# Patient Record
Sex: MALE | Race: WHITE | NOT HISPANIC OR LATINO | Employment: UNEMPLOYED | URBAN - METROPOLITAN AREA
[De-identification: names, ages, dates, MRNs, and addresses within clinical notes are randomized per-mention and may not be internally consistent; named-entity substitution may affect disease eponyms.]

---

## 2017-03-02 ENCOUNTER — OFFICE VISIT (OUTPATIENT)
Dept: NUTRITION | Facility: CLINIC | Age: 32
End: 2017-03-02
Payer: MEDICARE

## 2017-03-02 DIAGNOSIS — E78.2 MIXED HYPERLIPIDEMIA: Primary | ICD-10-CM

## 2017-03-02 PROCEDURE — 97802 MEDICAL NUTRITION INDIV IN: CPT | Mod: GY | Performed by: DIETITIAN, REGISTERED

## 2017-03-06 VITALS — HEIGHT: 73 IN | BODY MASS INDEX: 31.23 KG/M2 | WEIGHT: 235.6 LBS

## 2017-03-06 NOTE — PROGRESS NOTES
"REPORT OF MEDICAL NUTRITION THERAPY    Patient Name: Sid Zarco  MRN: [id number],  Age: 31 year old,  Gender: male    Encounter Date: 3/2/2017,  Encounter Time:30 minutes Initial      Provider: Nisa Magana, RICHA, LD, CDE    Referral received to provide Medical Nutrition Therapy for patient for treatment of hyperlipidemia.    NUTRITION HISTORY:    Sid states he had been referred to nutrition several times in the past due to his elevated lipids.  He reports that he has a strong family history of this, and is wondering how much influence his lifestyle changes will have on reducing risk.    He is a student now at [employer] and is very serious about his studies because he wants to move to a 4 year degree, and post graduate degree in the future.  He spends many hours studying, and has class on Monday and Wednesday.  On school days he does not eat until after school.  Overall, his intake pattern is somewhat irregular, and includes many meals in restaurants or prepared outside.    He takes 4-5 walks per week.  1 soda per month.  He  Has been improving his beverages.  Drinks water.  Low onion and low green pepper.  He has recently gained 10 pounds.    Usual intake consists of:  Breakfast:Skips often, coffee with 2% milk.  Will switch to 1%.  He does not like cereal, if he does have breakfast it may be a frozen convenience meal or a sandwich.  Snack:  Lunch:Eats late, often has fast food or restaurant food  Snack:  Dinner: Processed foods or fast foods often  Snack:        DATA:    Height:  6' .5\"  Weight: 235 lbs 9.6 oz  Body Mass Index: Body mass index is 31.51 kg/(m^2).     Wt Readings from Last 5 Encounters:   03/02/17 106.9 kg (235 lb 9.6 oz)   11/22/16 103.5 kg (228 lb 3.2 oz)   03/22/16 103.2 kg (227 lb 8 oz)   09/16/15 105.3 kg (232 lb 3.2 oz)   03/12/15 102 kg (224 lb 14.4 oz)     BP Readings from Last 5 Encounters:   11/22/16 126/78   03/22/16 133/79   09/16/15 130/84   03/12/15 114/76   11/12/14 137/78 "     LABS:  Last Basic Metabolic Panel:  Lab Results   Component Value Date     11/16/2014      Lab Results   Component Value Date    POTASSIUM 5.1 01/13/2015     Lab Results   Component Value Date    CHLORIDE 103 11/16/2014     Lab Results   Component Value Date    JACLYN 9.1 11/16/2014     Lab Results   Component Value Date    CO2 28 11/16/2014     Lab Results   Component Value Date    BUN 14 11/16/2014     Lab Results   Component Value Date    CR 0.97 01/13/2015     Lab Results   Component Value Date    GLC 90 03/22/2016     Recent Labs   Lab Test  11/22/16   1050  03/22/16   1126   CHOL  269*  237*   HDL  51  42   LDL  179*  155*   TRIG  197*  198*       MEDICATIONS:    Current Outpatient Prescriptions   Medication     montelukast (SINGULAIR) 10 MG tablet     albuterol (PROAIR HFA, PROVENTIL HFA, VENTOLIN HFA) 108 (90 BASE) MCG/ACT inhaler     zolpidem (AMBIEN) 10 MG tablet     ALPRAZolam (XANAX) 1 MG tablet     VITAMIN D, CHOLECALCIFEROL, PO     DoxePIN (SINEQUAN) 10 MG capsule     divalproex (DEPAKOTE ER) 500 MG 24 hr tablet     No current facility-administered medications for this visit.      ASSESSMENT:    Sid has a strong family history of dyslipidemia and his levels are high related to both genetics and a poor diet that has been high in fat and processed foods, and excessive in caloric density.    He is highly motivated to make changes, but recognizes his present role as a student has contributed to his poor eating style.    INTERVENTION:    Role of nutrition in lipid control was discussed.  Importance of reducing saturated fat by using low fat meat and dairy was discussed.  Importance of some home food preparation discussed.  Role of nutrition in weight control was discussed.  Strategy for success was discussed including importance of increased movement throughout the day, consistent restricted carb intake with choices coming from whole foods, and the importance of adequate intake of health promoting  food.  Importance of adequate but not excessive intake in a balanced and consistent pattern was emphasized along with the very important role of movement throughout the day in fighting insulin resistance.    In addition to calories in food, importance of balance and consistency of meals with adequate intake of health promoting foods was discussed.  Consistent carbohydrate meal planning was reviewed.  Carb choices are recommended to be from whole grains and vegetables, whole fruit and dairy.   Importance of reducing saturated fat by using low fat meat and dairy was discussed.  Mediterranean style food pattern was discussed as a model for food inclusion.  Recipes and menus were provided and discussed.  Mediterranean pattern was discussed as a method to reduce cardiovascular risk.    Energy balance was discussed, with focus on balance of foods consumed, amount of food eaten, timing of meals and activity level.  Nutrition to support activity was reviewed.  Importance of increasing activity throughout the day was again emphasized.  Written materials were provided and questions were answered    Energy balance was discussed, with focus on balance of foods consumed, amount of food eaten, timing of meals and activity level.    Written materials were provided and questions were answered.    PLAN:    Sid will avoid fast food.  He will plan in advance for meals in restaurants.  Options of healthy choices were discussed.  He will increase vegetable intake.  He will increase movement and activity in his day.  He will try some new recipes.    FOLLOW-UP  Patient will return as his schedule allows for follow-up as needed.      [address] [id number]  [phone]  [phone]    Date: 3/6/2017  Time: 2:06 PM

## 2017-10-25 ENCOUNTER — TRANSFERRED RECORDS (OUTPATIENT)
Dept: HEALTH INFORMATION MANAGEMENT | Facility: CLINIC | Age: 32
End: 2017-10-25

## 2017-11-06 DIAGNOSIS — F41.1 GENERALIZED ANXIETY DISORDER: Primary | ICD-10-CM

## 2017-11-06 DIAGNOSIS — E78.00 ELEVATED CHOLESTEROL: ICD-10-CM

## 2017-11-06 DIAGNOSIS — J45.909 ASTHMA: ICD-10-CM

## 2017-11-06 DIAGNOSIS — F41.0 PANIC DISORDER WITHOUT AGORAPHOBIA WITH PANIC ATTACKS IN FULL REMISSION: ICD-10-CM

## 2017-11-06 DIAGNOSIS — Z86.59 HISTORY OF SCHIZOAFFECTIVE DISORDER: ICD-10-CM

## 2017-11-22 ENCOUNTER — OFFICE VISIT (OUTPATIENT)
Dept: SLEEP MEDICINE | Facility: CLINIC | Age: 32
End: 2017-11-22
Attending: PSYCHIATRY & NEUROLOGY
Payer: MEDICARE

## 2017-11-22 VITALS
HEART RATE: 73 BPM | BODY MASS INDEX: 31.83 KG/M2 | WEIGHT: 235 LBS | RESPIRATION RATE: 16 BRPM | SYSTOLIC BLOOD PRESSURE: 138 MMHG | OXYGEN SATURATION: 96 % | DIASTOLIC BLOOD PRESSURE: 78 MMHG | HEIGHT: 72 IN

## 2017-11-22 DIAGNOSIS — G47.21 DELAYED SLEEP PHASE SYNDROME: ICD-10-CM

## 2017-11-22 DIAGNOSIS — Z82.0 FAMILY HISTORY OF SLEEP APNEA: ICD-10-CM

## 2017-11-22 DIAGNOSIS — G47.00 INSOMNIA, UNSPECIFIED TYPE: Primary | ICD-10-CM

## 2017-11-22 DIAGNOSIS — F41.1 GENERALIZED ANXIETY DISORDER: ICD-10-CM

## 2017-11-22 DIAGNOSIS — R06.83 SNORING: ICD-10-CM

## 2017-11-22 PROCEDURE — 99204 OFFICE O/P NEW MOD 45 MIN: CPT | Performed by: PHYSICIAN ASSISTANT

## 2017-11-22 NOTE — PROGRESS NOTES
Sleep Consultation:    Date on this visit: 11/22/2017    Sid Zarco is sent by Nathan Pham for a sleep consultation regarding insomnia.    Primary Physician: Erik Newberyr     Sid Zarco reports waking after 3-6 hours of sleep for about the last 3 months. His medical history is significant for JODI/panic disorder, bipolar disorder, asthma, epilepsy (diagnosed in youth, not confirmed on subsequent EEGs), obesity. His chart shows a history of schizoaffective disorder, unclear if that is a correct diagnosis.    He started college around the time his trouble sleeping started. He has classes only on Monday and Wednesday mornings. He has not had insomnia like this in the past. He has had difficulty falling asleep in the past but not staying asleep. He has been on zolpidem for about 1.5 years. He has been on doxepin for a couple of weeks. It does seem to help him get back to sleep. He is sometimes sleepy (difficulty getting out of bed) in the morning. He has slept through his alarm and woke around 8 AM. Once he gets up, his energy is fine. He takes the medications 30-40 minutes before bedtime. He denies having more panic attacks since starting school. He says it is possible his anxiety is a little higher at a baseline level.     Sid goes to sleep at 11:00 PM during the week. He wakes up at 7:00 AM with an alarm on Mondays and Wednesdays only. He has found it harder to get up to the alarm since being on doxepin. Prior to doxepin, he was awake before the alarm. He falls asleep in 40 minutes.  Sid has difficulty falling asleep on some nights. It can take a couple of hours. He has been taking 3 mg doxepin and 10 mg zolpidem nightly.  He wakes up 1 time a night for 120 minutes before falling back to sleep.  Sid wakes up to either anxiety or possible sleep apnea.  On weekends, Sid goes to sleep at 2:00 AM.  He wakes up at 10:00 AM without an alarm. He will still have middle of the night awakenings. He is less  sleepy when he can sleep in. He falls asleep more quickly when he goes to bed at 2 AM.  Patient gets an average of 3-5 hours of sleep per week night and 5-7 hours on weekends. He has been on quetiapine and hydroxyzine in the past.    Patient does use electronics in bed, watch TV in bed and worry in bed about school work or his day and does not read in bed. He was taking alprazolam about 4 times per week to help him sleep until he got doxepin. He has not used it much since then.    Sid is not currently working. He lives with his mother and brother.  His brother and father have KELLEY.    Sid does snore every night and snoring is loud. His snoring seems to be louder in the last few months. Patient does have a regular roommate (his brother). His brother has not left the room because of his snoring. There is no report of gasping, snorting or witnessed apneas.  Patient sleeps on his back, side and stomach. He has occasional morning dry mouth, denies snort arousals, morning headaches, morning confusion and restless legs. He has been getting [place] when he wakes occasionally. Sid has occasional bruxism and sleep talking and denies any sleep walking, dream enactment, sleep paralysis, cataplexy and hypnogogic/hypnopompic hallucinations.    Sid has heartburn, depression and anxiety, denies difficulty breathing through his nose, claustrophobia and reflux at night.      Sid has gained 5-10 pounds in 5 years.  Patient describes themself as a night person.  He would prefer to go to sleep at 2:00 AM and wake up at 10:00 AM.  Patient's [place] Sleepiness score 0/24 inconsistent with daytime sleepiness. He says he was really tired when he was only getting 3 hours of sleep. He denies that he was dozing, but said he would spill beverages because his coordination was off.      Sid naps 1-2 times per week for 40 minutes, feels refreshed after naps. He rests if he did not get much sleep the night before. He does not always  fall asleep. He takes no inadvertant naps.  He denies dozing while driving but does feel inattentive. Patient was counseled on the importance of driving while alert, to pull over if drowsy, or nap before getting into the vehicle if sleepy.  He uses 20-40 cups/day of coffee/tea. Last caffeine intake is usually 8-9 hours before bed.    Allergies:    Allergies   Allergen Reactions     Dust Mites      Mildew        Medications:    Current Outpatient Prescriptions   Medication Sig Dispense Refill     montelukast (SINGULAIR) 10 MG tablet Take 1 tablet (10 mg) by mouth At Bedtime 90 tablet 1     albuterol (PROAIR HFA, PROVENTIL HFA, VENTOLIN HFA) 108 (90 BASE) MCG/ACT inhaler Inhale 2 puffs into the lungs every 6 hours as needed for shortness of breath / dyspnea Okay to dispense any form of albuterol inhaler that is covered by insurance. 1 Inhaler 1     zolpidem (AMBIEN) 10 MG tablet Take 10 mg by mouth nightly as needed  3     ALPRAZolam (XANAX) 1 MG tablet as needed   3     VITAMIN D, CHOLECALCIFEROL, PO Take 5,000 Units by mouth daily       DoxePIN (SINEQUAN) 10 MG capsule Take 1 capsule (10 mg) by mouth At Bedtime       divalproex (DEPAKOTE ER) 500 MG 24 hr tablet Take 1 tablet (500 mg) by mouth 2 times daily 60 tablet 0       Problem List:  Patient Active Problem List    Diagnosis Date Noted     Mixed hyperlipidemia 03/30/2016     Priority: Medium     Schizoaffective disorder, unspecified 03/22/2016     Priority: Medium     Obesity (BMI 30-39.9) 09/16/2015     Priority: Medium     Schizo-affective psychosis (H) 11/08/2014     Priority: Medium     Schizophrenic episode, acute, chronic condition (H) 12/29/2013     Priority: Medium     Chronic mental illness 12/27/2012     Priority: Medium     Followed by psychiatry - Dr. Pete at [facility].       CARDIOVASCULAR SCREENING; LDL GOAL LESS THAN 160 01/12/2011     Priority: Medium     Panic disorder      Priority: Medium     Generalized anxiety disorder      Priority: Medium     " Dr. Gudino follows  He recommended xanax and mirtazipine            Past Medical/Surgical History:  Past Medical History:   Diagnosis Date     Epilepsy and recurrent seizures     \"in remission\", not on any meds     Generalized anxiety disorder      Mild persistent asthma 10/25/2010     Panic disorder      Schizoaffective disorder      Past Surgical History:   Procedure Laterality Date     none         Social History:  Social History     Social History     Marital status: Single     Spouse name: N/A     Number of children: 0     Years of education: N/A     Occupational History      Unemployed     Social History Main Topics     Smoking status: Former Smoker     Smokeless tobacco: Never Used     Alcohol use Yes      Comment: socially, on holidays     Drug use: No     Sexual activity: No     Other Topics Concern     Not on file     Social History Narrative       Family History:  Family History   Problem Relation Age of Onset     DIABETES Maternal Grandfather      DIABETES Paternal Grandmother      Schizophrenia Mother      DIABETES Mother      Bipolar Disorder Father      Anxiety Disorder Brother      Schizophrenia Brother      Depression Brother      Bipolar Disorder Brother      Schizophrenia Other        Review of Systems:  A complete review of systems reviewed by me is negative with the exeption of what has been mentioned in the history of present illness.  CONSTITUTIONAL: NEGATIVE for weight gain/loss, fever, chills, sweats or night sweats, drug allergies.  EYES: NEGATIVE for changes in vision, blind spots, double vision.  ENT: NEGATIVE for ear pain, sore throat, sinus pain, post-nasal drip, runny nose, bloody nose  CARDIAC: NEGATIVE for fast heartbeats or fluttering in chest, breathlessness when lying flat, swollen legs or swollen feet.  CARDIAC:  POSITIVE for  chest pain and chest pressure  NEUROLOGIC: NEGATIVE headaches, numbness in the arms or legs.  NEUROLOGIC:  POSITIVE for  weakness in the arms or " legs  DERMATOLOGIC: NEGATIVE for rashes, new moles or change in mole(s)  PULMONARY: NEGATIVE SOB at rest, SOB with activity, coughing up blood, wheezing or whistling when breathing.    PULMONARY:  POSITIVE for  dry cough and productive cough  GASTROINTESTINAL: NEGATIVE for nausea or vomitting, loose or watery stools, fat or grease in stools, constipation, abdominal pain.  GASTROINTESTINAL:  POSITIVE for  bowel movements black in color and blood in stool  GENITOURINARY: NEGATIVE for pain during urination, blood in urine, irregular menstrual periods.  GENITOURINARY:  POSITIVE for  urinating more frequently than usual  MUSCULOSKELETAL: NEGATIVE for bone or joint pain, swollen joints.  MUSCULOSKELETAL:  POSITIVE for  muscle pain  ENDOCRINE: NEGATIVE for increased thirst or urination, diabetes.  LYMPHATIC: NEGATIVE for swollen lymph nodes, lumps or bumps in the breasts or nipple discharge.  MENTAL HEALTH: POSITIVE for depression, anxiety    Physical Examination:  Vitals: /78  Pulse 73  Resp 16  Ht 1.829 m (6')  Wt 106.6 kg (235 lb)  SpO2 96%  BMI 31.87 kg/m2  Neck Circumference: 40 cm.     [place] Total Score 11/22/2017   Total score - [place] 0       GENERAL APPEARANCE: healthy, alert, no distress and cooperative  EYES: Eyes grossly normal to inspection, PERRL, conjunctivae and sclerae normal and lids and lashes normal  HENT: nose and mouth without ulcers or lesions, oral mucous membranes moist and oropharynx clear  NECK: no adenopathy, no asymmetry, masses, or scars, thyroid normal to palpation and trachea midline and normal to palpation  RESP: lungs clear to auscultation - no rales, rhonchi or wheezes  CV: regular rates and rhythm, normal S1 S2, no S3 or S4, no murmur, click or rub and no irregular beats  LYMPHATICS: normal ant/post cervical and supraclavicular nodes  MS: extremities normal- no gross deformities noted  NEURO: Normal strength and tone, mentation intact, speech normal and cranial nerves 2-12  intact  Mallampati Class: II.  Tonsillar Stage: 2  visible at pillars.    Impression/Plan:    (G47.00) Insomnia, unspecified type  (primary encounter diagnosis), (G47.21) Delayed sleep phase syndrome, (F41.1) Generalized anxiety disorder  Comment: Since the end of August, he has been waking after 3-6 hours of sleep and having difficulty getting back to sleep. This coincides with starting classes on Mondays and Wednesdays. His sleep schedule was about 2-10 AM, which he keeps on days he does not have classes. He tries to sleep 11 PM to 7 AM when he has classes.  His trouble sleeping can occur on any day of the week, not just the nights he has class. He has been on zolpidem 10 mg for at least 1.5 years. Doxepin 3 mg was added with this new insomnia and it has been fairly helpful at getting him back to sleep more quickly. He has felt a little more difficulty getting out of bed in the morning, although he says it is not necessarily because he is sleepy. He has a history of anxiety and says it is possible he has an increase in his baseline anxiety levels, but overall that did not seem to resonate with him.   Plan: We discussed getting 30 minutes of bright light exposure in the morning, either with the sun or a SAD Lamp of 10,000 lux intensity. Avoid bright light, including electronics, in the hour before bedtime. Take 0.5-1 mg melatonin 3-5 hours prior to natural sleep onset. Set the alarm gradually earlier by 5-10 minutes every other day. Move the bedtime in a corresponding fashion to maintain 8 hours in bed each night. See if you can sleep 1 AM to 9 AM nightly and 1-7 AM on days with class. You may try to nap for up to 2 hours on those days. Alternatively, if having success advancing his sleep schedule, he may continue to try to move it earlier and keep a fairly consistent schedule on all days.     (R06.83) Snoring, (Z82.0) Family history of sleep apnea  Comment: Sid has some concerns for KELLEY. Overall, his risk is  somewhat low, and I don't think that apnea would be contributing significantly to his current difficulty sleeping as there is no reason why apnea would have changed abruptly at the time of onset of the insomnia. His STOP BANG is 3 (snoring, neck 40 cm, male). Negative risk factors include; no sleepiness (ESS 0/24), no observed apnea, no HTN, BMI <35 (31), age <50 (32). He does however have a strong family history of KELLEY in his father and brother, so I am recommending a screening with oximetry.  Plan: Overnight oximetry study        I will call him with results of the oximetry. I also advised him to download the SnoreLab joya on his phone so that he can listen for snorts, gasps and pauses in his breathing during sleep. I will order a sleep study if the oximetry or SnoreLab observations are abnormal.      Literature provided regarding sleep apnea and insomnia.      He will follow up with me in approximately two months.       Polysomnography reviewed.  Obstructive sleep apnea reviewed.  Complications of untreated sleep apnea were reviewed.  45 minutes was spent during this visit, over 50% in counseling and coordination of care.   Bennett Goltz, PA-C    CC: Nathan Pham

## 2017-11-22 NOTE — PATIENT INSTRUCTIONS
Aim for 8 hours in bed nightly. Set your alarm in the morning every day. Set it 5-10 minutes earlier every other day. Try to move your sleep schedule to 1-9 AM. On school days, keep going to bed at 1 AM and get up at 7 AM. You may try to take a nap in the early afternoon, no more than 2 hours. If you can advance your schedule more than that, it will help to get you more sleep on a consistent basis at night. See how far you can move your sleep schedule. Stop if you get to 11 PM to 7 AM. You may find that it will advance to a point and then won't move earlier.     Instructions for treating Delayed Sleep Phase Syndrome:    Delayed Sleep Phase Syndrome (DSPS) means that your body's internal timing is set late compared to the 24 hour day. Therefore, it is often difficult to get up on time for work in the morning and sometimes difficult to fall asleep on time, in order to get enough sleep. People with DSPS often tend to like to stay up late on weekends and sleep in until between 10 AM and noon, sometimes even later.This is actually a bad habit that will perpetuate the problem. It reinforces your body's tendency to be on that later schedule.    You should go to bed when you are sleepy and ready to sleep. During this entire process, you should not engage in activities that may make it worse, such as watching TV in bed, leaving the TV on all night, drinking any caffeine 6 hours before bed or exercising 1-2 hours before bed.     Start taking Melatonin, 0.5-1 mg tablet 3-5 hours before the time that you fall asleep on average (not your desired bedtime or time that you get in bed, but the time you normally fall asleep on your own).     Upon awakening, get exposure to sun-light for about 30-45 minutes. You do not need to look at the sun, in fact, this is dangerous. Reading the paper with the sun shining on you is adequate.  Alternatively, you may use a Seasonal Affective Disorder Lamp (intensity 10,000 Lux) instead of the sun.  The lamp should be positioned 1-2 arms lengths away from you. They lamps are sold at Home [employer] Companies such as [employer], [employer] or [employer]. A prescription can be written to get insurance coverage in some cases. They are also sold on [employer].    Using the light and melatonin should help march your internal clock (known as your circadian rhythms) gradually earlier. As your bedtime advances, remember to take your melatonin earlier, keeping it 5 hours before your fall asleep time.    Avoid naps and sleeping in because sleeping during the day will delay your body's clock and you will have to start from scratch.     More information about light therapy:  If the cost of any light box is too much, you can also purchase a compact fluorescent all spectrum light bulb at a local hardware store for about $8.  The most commonly available bulb is 1400 lumen.  You would need two of these positioned within 1 meter of yourself to be equivalent to 2,500 lux.  The bulbs can be placed in a standard light fixture.  Additionally, they can be placed in a mountable fixture that is used in greenhouses.  Mountable fixtures are also available at hardware stores for about $9.  Do not look directly at the light.  If you have any concerns regarding the safety of bright light therapy for you, it is recommended that you consult an ophthalmologist before using a light box.  If you have a condition that makes your eyes very sensitive to light, macular degeneration, a family history of such problems, or diabetic changes to your eyes, consult an ophthalmologist before using a light box. If you have anxiety disorder and have an increase in anxiety discontinue use.         MY TREATMENT INFORMATION FOR SLEEP APNEA-  Sid Zarco    DOCTOR : Bennett Ezra Goltz, PA-C  SLEEP CENTER : [facility]     MY CONTACT NUMBER: [phone]    Am I having a sleep study at a sleep center?  Make sure you have an appointment for the study before you  "leave!    Am I having a home sleep study?  Watch this video:  https://www.[employer].com/watch?v=CteI_GhyP9g&list=PLC4F_nvCEvSxpvRkgPszaicmjcb2PMExm    Frequently asked questions:  1. What is Obstructive Sleep Apnea (KELLEY)? KELLEY is the most common type of sleep apnea. Apnea means, \"without breath.\"  Apnea is most often caused by narrowing or collapse of the upper airway as muscles relax during sleep.   Almost everyone has occasional apneas. Most people with sleep apnea have had brief interruptions at night frequently for many years.  The severity of sleep apnea is related to how frequent and severe the events are.   2. What are the consequences of KELLEY? Symptoms include: feeling sleepy during the day, snoring loudly, gasping or stopping of breathing, trouble sleeping, and occasionally morning headaches or heartburn at night.  Sleepiness can be serious and even increase the risk of falling asleep while driving. Other health consequences may include development of high blood pressure and other cardiovascular disease in persons who are susceptible. Untreated KELLEY  can contribute to heart disease, stroke and diabetes.   3. What are the treatment options? In most situations, sleep apnea is a lifelong disease that must be managed with daily therapy. Medications are not effective for sleep apnea and surgery is generally not considered until other therapies have been tried. Your treatment is your choice. Continuous Positive Airway (CPAP) works right away and is the therapy that is effective in nearly everyone. An oral device to hold your jaw forward is usually the next most reliable option. Other options include postioning devices (to keep you off your back), weight loss, and surgery including a tongue pacing device. There is more detail about some of these options below.    Important tips for using CPAP and similar devices   Know your equipment:  CPAP is continuous positive airway pressure that prevents obstructive sleep apnea by " keeping the throat from collapsing while you are sleeping. In most cases, the device is  smart  and can slowly self-adjusts if your throat collapses and keeps a record every day of how well you are treated-this information is available to you and your care team.  BPAP is bilevel positive airway pressure that keeps your throat open and also assists each breath with a pressure boost to maintain adequate breathing.  Special kinds of BPAP are used in patients who have inadequate breathing from lung or heart disease. In most cases, the device is  smart  and can slowly self-adjusts to assist breathing. Like CPAP, the device keeps a record of how well you are treated.  Your mask is your connection to the device. You get to choose what feels most comfortable and the staff will help to make sure if fits. Here: are some examples of the different masks that are available:       Key points to remember on your journey with sleep apnea:  1. Sleep study.  PAP devices often need to be adjusted during a sleep study to show that they are effective and adjusted right.  2. Good tips to remember: Try wearing just the mask during a quiet time during the day so your body adapts to wearing it. A humidifier is recommended for comfort in most cases to prevent drying of your nose and throat. Allergy medication from your provider may help you if you are having nasal congestion.  3. Getting settled-in. It takes more than one night for most of us to get used to wearing a mask. Try wearing just the mask during a quiet time during the day so your body adapts to wearing it. A humidifier is recommended for comfort in most cases. Our team will work with you carefully on the first day and will be in contact within 4 days and again at 2 and 4 weeks for advice and remote device adjustments. Your therapy is evaluated by the device each day.   4. Use it every night. The more you are able to sleep naturally for 7-8 hours, the more likely you will have good  sleep and to prevent health risks or symptoms from sleep apnea. Even if you use it 4 hours it helps. Occasionally all of us are unable to use a medical therapy, in sleep apnea, it is not dangerous to miss one night.   5. Communicate. Call our skilled team on the number provided on the first day if your visit for problems that make it difficult to wear the device. Over 2 out of 3 patients can learn to wear the device long-term with help from our team. Remember to call our team or your sleep providers if you are unable to wear the device as we may have other solutions for those who cannot adapt to mask CPAP therapy. It is recommended that you sleep your sleep provider within the first 3 months and yearly after that if you are not having problems.   Take care of your equipment. Make sure you clean your mask and tubing using directions every day and that your filter and mask are replaced as recommended or if they are not working.     BESIDES CPAP, WHAT OTHER THERAPIES ARE THERE?    Positioning Device  Positioning devices are generally used when sleep apnea is mild and only occurs on your back.This example shows a pillow that straps around the waist. It may be appropriate for those whose sleep study shows milder sleep apnea that occurs primarily when lying flat on one's back. Preliminary studies have shown benefit but effectiveness at home may need to be verified by a home sleep test. These devices are generally not covered by medical insurance.  Examples of devices that maintain sleeping on the back to prevent snoring and mild sleep apnea.    Belt type body positioner  Http://[employer]/    Electronic reminder  Http://nightshifttherapy.com/  Http://www.[employer]Box[employer].au/    Oral Appliance  What is oral appliance therapy?  An oral appliance device fits on your teeth at night like a retainer used after having braces. The device is made by a specialized dentist and requires several visits over 1-2 months before a  manufactured device is made to fit your teeth and is adjusted to prevent your sleep apnea. Once an oral device is working properly, snoring should be improved. A home sleep test may be recommended at that time if to determine whether the sleep apnea is adequately treated.       Some things to remember:  -Oral devices are often, but not always, covered by your medical insurance. Be sure to check with your insurance provider.   -If you are referred for oral therapy, you will be given a list of specialized dentists to consider or you may choose to visit the Web site of the American Academy of Dental Sleep Medicine  -Oral devices are less likely to work if you have severe sleep apnea or are extremely overweight.     More detailed information  An oral appliance is a small acrylic device that fits over the upper and lower teeth  (similar to a retainer or a mouth guard). This device slightly moves jaw forward, which moves the base of the tongue forward, opens the airway, improves breathing for effective treat snoring and obstructive sleep apnea in perhaps 7 out of 10 people .  The best working devices are custom-made by a dental device  after a mold is made of the teeth 1, 2, 3.  When is an oral appliance indicated?  Oral appliance therapy is recommended as a first-line treatment for patients with primary snoring, mild sleep apnea, and for patients with moderate sleep apnea who prefer appliance therapy to use of CPAP4, 5. Severity of sleep apnea is determined by sleep testing and is based on the number of respiratory events per hour of sleep.   How successful is oral appliance therapy?  The success rate of oral appliance therapy in patients with mild sleep apnea is 75-80% while in patients with moderate sleep apnea it is 50-70%. The chance of success in patients with severe sleep apnea is 40-50%. The research also shows that oral appliances have a beneficial effect on the cardiovascular health of KELLEY patients  at the same magnitude as CPAP therapy7.  Oral appliances should be a second-line treatment in cases of severe sleep apnea, but if not completely successful then a combination therapy utilizing CPAP plus oral appliance therapy may be effective. Oral appliances tend to be effective in a broad range of patients although studies show that the patients who have the highest success are females, younger patients, those with milder disease, and less severe obesity. 3, 6.   Finding a dentist that practices dental sleep medicine  Specific training is available through the American Academy of Dental Sleep Medicine for dentists interested in working in the field of sleep. To find a dentist who is educated in the field of sleep and the use of oral appliances, near you, visit the Web site of the American Academy of Dental Sleep Medicine.    References  1. Maylin et al. Objectively measured vs self-reported compliance during oral appliance therapy for sleep-disordered breathing. Chest 2013; 144(5): 9139-5978.  2. Merly et al. Objective measurement of compliance during oral appliance therapy for sleep-disordered breathing. Thorax 2013; 68(1): 91-96.  3. Melissa, et al. Mandibular advancement devices in 620 men and women with KELLEY and snoring: tolerability and predictors of treatment success. Chest 2004; 125: 2929-7252.  4. Palm, et al. Oral appliances for snoring and KELLEY: a review. Sleep 2006; 29: 244-262.  5. Josafat et al. Oral appliance treatment for KELLEY: an update. J Clin Sleep Med 2014; 10(2): 215-227.  6. Lakeisha et al. Predictors of OSAH treatment outcome. J Dent Res 2007; 86: 1953-6552.    Weight Loss:    Weight loss is a long-term strategy that may improve sleep apnea in some patients.    Weight management is a personal decision.  If you are interested in exploring weight loss strategies, the following discussion covers the impact on weight loss on sleep apnea and the approaches that may be  successful.    Weight loss decreases severity of sleep apnea in most people with obesity. For those with mild obesity who have developed snoring with weight gain, even 15-30 pound weight loss can improve and occasionally eliminate sleep apnea.  Structured and life-long dietary and health habits are necessary to lose weight and keep healthier weight levels.     Though there may be significant health benefits from weight loss, long-term weight loss is very difficult to achieve- studies show success with dietary management in less than 10% of people. In addition, substantial weight loss may require years of dietary control and may be difficult if patients have severe obesity. In these cases, surgical management may be considered.  Finally, older individuals who have tolerated obesity without health complications may be less likely to benefit from weight loss strategies.        Your BMI is Body mass index is 31.87 kg/(m^2).  Weight management is a personal decision.  If you are interested in exploring weight loss strategies, the following discussion covers the approaches that may be successful. Body mass index (BMI) is one way to tell whether you are at a healthy weight, overweight, or obese. It measures your weight in relation to your height.  A BMI of 18.5 to 24.9 is in the healthy range. A person with a BMI of 25 to 29.9 is considered overweight, and someone with a BMI of 30 or greater is considered obese. More than two-thirds of American adults are considered overweight or obese.  Being overweight or obese increases the risk for further weight gain. Excess weight may lead to heart disease and diabetes.  Creating and following plans for healthy eating and physical activity may help you improve your health.  Weight control is part of healthy lifestyle and includes exercise, emotional health, and healthy eating habits. Careful eating habits lifelong are the mainstay of weight control. Though there are significant  health benefits from weight loss, long-term weight loss with diet alone may be very difficult to achieve- studies show long-term success with dietary management in less than 10% of people. Attaining a healthy weight may be especially difficult to achieve in those with severe obesity. In some cases, medications, devices and surgical management might be considered.  What can you do?  If you are overweight or obese and are interested in methods for weight loss, you should discuss this with your provider.     Consider reducing daily calorie intake by 500 calories.     Keep a food journal.     Avoiding skipping meals, consider cutting portions instead.    Diet combined with exercise helps maintain muscle while optimizing fat loss. Strength training is particularly important for building and maintaining muscle mass. Exercise helps reduce stress, increase energy, and improves fitness. Increasing exercise without diet control, however, may not burn enough calories to loose weight.       Start walking three days a week 10-20 minutes at a time    Work towards walking thirty minutes five days a week     Eventually, increase the speed of your walking for 1-2 minutes at time    In addition, we recommend that you review healthy lifestyles and methods for weight loss available through the National Institutes of Health patient information sites:  http://win.niddk.nih.gov/publications/index.htm    And look into health and wellness programs that may be available through your health insurance provider, employer, local community center, or shane club.    Weight management plan: Patient was referred to their PCP to discuss a diet and exercise plan.    Surgery:    Surgery for obstructive sleep apnea is considered generally only when other therapies fail to work. Surgery may be discussed with you if you are having a difficult time tolerating CPAP and or when there is an abnormal structure that requires surgical correction.  Nose and  throat surgeries often enlarge the airway to prevent collapse.  Most of these surgeries create pain for 1-2 weeks and up to half of the most common surgeries are not effective throughout life.  You should carefully discuss the benefits and drawbacks to surgery with your sleep provider and surgeon to determine if it is the best solution for you.   More information  Surgery for KELLEY is directed at areas that are responsible for narrowing or complete obstruction of the airway during sleep.  There are a wide range of procedures available to enlarge and/or stabilize the airway to prevent blockage of breathing in the three major areas where it can occur: the palate, tongue, and nasal regions.  Successful surgical treatment depends on the accurate identification of the factors responsible for obstructive sleep apnea in each person.  A personalized approach is required because there is no single treatment that works well for everyone.  Because of anatomic variation, consultation with an examination by a sleep surgeon is a critical first step in determining what surgical options are best for each patient.  In some cases, examination during sedation may be recommended in order to guide the selection of procedures.  Patients will be counseled about risks and benefits as well as the typical recovery course after surgery. Surgery is typically not a cure for a person s KELLEY.  However, surgery will often significantly improve one s KELLEY severity (termed  success rate ).  Even in the absence of a cure, surgery will decrease the cardiovascular risk associated with OSA7; improve overall quality of life8 (sleepiness, functionality, sleep quality, etc).      Palate Procedures:  Patients with KELLEY often have narrowing of their airway in the region of their tonsils and uvula.  The goals of palate procedures are to widen the airway in this region as well as to help the tissues resist collapse.  Modern palate procedure techniques focus on  tissue conservation and soft tissue rearrangement, rather than tissue removal.  Often the uvula is preserved in this procedure. Residual sleep apnea is common in patient after pharyngoplasty with an average reduction in sleep apnea events of 33%2.      Tongue Procedures:  ExamWhile patients are awake, the muscles that surround the throat are active and keep this region open for breathing. These muscles relax during sleep, allowing the tongue and other structures to collapse and block breathing.  There are several different tongue procedures available.  Selection of a tongue base procedure depends on characteristics seen on physical exam.  Generally, procedures are aimed at removing bulky tissues in this area or preventing the back of the tongue from falling back during sleep.  Success rates for tongue surgery range from 50-62%3.    Hypoglossal Nerve Stimulation:  Hypoglossal nerve stimulation has recently received approval from the [place] Food and Drug Administration for the treatment of obstructive sleep apnea.  This is based on research showing that the system was safe and effective in treating sleep apnea6.  Results showed that the median AHI score decreased 68%, from 29.3 to 9.0. This therapy uses an implant system that senses breathing patterns and delivers mild stimulation to airway muscles, which keeps the airway open during sleep.  The system consists of three fully implanted components: a small generator (similar in size to a pacemaker), a breathing sensor, and a stimulation lead.  Using a small handheld remote, a patient turns the therapy on before bed and off upon awakening.    Candidates for this device must be greater than 22 years of age, have moderate to severe KELLEY (AHI between 20-65), BMI less than 32, have tried CPAP/oral appliance without success, and have appropriate upper airway anatomy (determined by a sleep endoscopy performed by Dr. Jane).    Hypoglossal Nerve Stimulation  Pathway:    The sleep surgeon s office will work with the patient through the insurance prior-authorization process (including communications and appeals).    Nasal Procedures:  Nasal obstruction can interfere with nasal breathing during the day and night.  Studies have shown that relief of nasal obstruction can improve the ability of some patients to tolerate positive airway pressure therapy for obstructive sleep apnea1.  Treatment options include medications such as nasal saline, topical corticosteroid and antihistamine sprays, and oral medications such as antihistamines or decongestants. Non-surgical treatments can include external nasal dilators for selected patients. If these are not successful by themselves, surgery can improve the nasal airway either alone or in combination with these other options.      Combination Procedures:  Combination of surgical procedures and other treatments may be recommended, particularly if patients have more than one area of narrowing or persistent positional disease.  The success rate of combination surgery ranges from 66-80%2,3.    References  1. Sam MORENO. The Role of the Nose in Snoring and Obstructive Sleep Apnoea: An Update.  Eur Arch Otorhinolaryngol. 2011; 268: 1365-73.  2.  Hanna SM; Matthew JA; Jese JR; Pallanch JF; Ryne MB; Marky SG; Soham RICHARDS. Surgical modifications of the upper airway for obstructive sleep apnea in adults: a systematic review and meta-analysis. SLEEP 2010;33(10):7891-2001. Jessie CARUSO. Hypopharyngeal surgery in obstructive sleep apnea: an evidence-based medicine review.  Arch Otolaryngol Head Neck Surg. 2006 Feb;132(2):206-13.  3. Marc YH1, Jens Y, Raul MELISSA. The efficacy of anatomically based multilevel surgery for obstructive sleep apnea. Otolaryngol Head Neck Surg. 2003 Oct;129(4):327-35.  4. Jessie CARUSO, Goldberg A. Hypopharyngeal Surgery in Obstructive Sleep Apnea: An Evidence-Based Medicine Review. Arch Otolaryngol Head Neck Surg. 2006  Feb;132(2):206-13.  5. Loren PJ et al. Upper-Airway Stimulation for Obstructive Sleep Apnea.  N Engl J Med. 2014 Jan 9;370(2):139-49.  6. Tay Y et al. Increased Incidence of Cardiovascular Disease in Middle-aged Men with Obstructive Sleep Apnea. Am J Respir Crit Care Med; 2002 166: 159-165  7. Stiven SHAH et al. Studying Life Effects and Effectiveness of Palatopharyngoplasty (SLEEP) study: Subjective Outcomes of Isolated Uvulopalatopharyngoplasty. Otolaryngol Head Neck Surg. 2011; 144: 623-631.

## 2017-11-22 NOTE — NURSING NOTE
Chief Complaint   Patient presents with     Sleep Problem     Difficulty staying asleep        Initial /78  Pulse 73  Resp 16  Ht 1.829 m (6')  Wt 106.6 kg (235 lb)  SpO2 96%  BMI 31.87 kg/m2 Estimated body mass index is 31.87 kg/(m^2) as calculated from the following:    Height as of this encounter: 1.829 m (6').    Weight as of this encounter: 106.6 kg (235 lb).  Medication Reconciliation: complete   Neck 40cm  ESS 0  Rona Holt MA

## 2017-11-22 NOTE — MR AVS SNAPSHOT
After Visit Summary   11/22/2017    Sid Zarco    MRN: [id number]           Patient Information     Date Of Birth          1985        Visit Information        Provider Department      11/22/2017 2:00 PM Goltz, Bennett Ezra, PA-C [facility]        Today's Diagnoses     Insomnia, unspecified type    -  1    Snoring        Delayed sleep phase syndrome        Generalized anxiety disorder        Family history of sleep apnea          Care Instructions    Aim for 8 hours in bed nightly. Set your alarm in the morning every day. Set it 5-10 minutes earlier every other day. Try to move your sleep schedule to 1-9 AM. On school days, keep going to bed at 1 AM and get up at 7 AM. You may try to take a nap in the early afternoon, no more than 2 hours. If you can advance your schedule more than that, it will help to get you more sleep on a consistent basis at night. See how far you can move your sleep schedule. Stop if you get to 11 PM to 7 AM. You may find that it will advance to a point and then won't move earlier.     Instructions for treating Delayed Sleep Phase Syndrome:    Delayed Sleep Phase Syndrome (DSPS) means that your body's internal timing is set late compared to the 24 hour day. Therefore, it is often difficult to get up on time for work in the morning and sometimes difficult to fall asleep on time, in order to get enough sleep. People with DSPS often tend to like to stay up late on weekends and sleep in until between 10 AM and noon, sometimes even later.This is actually a bad habit that will perpetuate the problem. It reinforces your body's tendency to be on that later schedule.    You should go to bed when you are sleepy and ready to sleep. During this entire process, you should not engage in activities that may make it worse, such as watching TV in bed, leaving the TV on all night, drinking any caffeine 6 hours before bed or exercising 1-2 hours before bed.     Start taking  Melatonin, 0.5-1 mg tablet 3-5 hours before the time that you fall asleep on average (not your desired bedtime or time that you get in bed, but the time you normally fall asleep on your own).     Upon awakening, get exposure to sun-light for about 30-45 minutes. You do not need to look at the sun, in fact, this is dangerous. Reading the paper with the sun shining on you is adequate.  Alternatively, you may use a Seasonal Affective Disorder Lamp (intensity 10,000 Lux) instead of the sun. The lamp should be positioned 1-2 arms lengths away from you. They lamps are sold at [employer] such as [employer] or "[employer]. A prescription can be written to get insurance coverage in some cases. They are also sold on [employer].com.    Using the light and melatonin should help march your internal clock (known as your circadian rhythms) gradually earlier. As your bedtime advances, remember to take your melatonin earlier, keeping it 5 hours before your fall asleep time.    Avoid naps and sleeping in because sleeping during the day will delay your body's clock and you will have to start from scratch.     More information about light therapy:  If the cost of any light box is too much, you can also purchase a compact fluorescent all spectrum light bulb at a local hardware store for about $8.  The most commonly available bulb is 1400 lumen.  You would need two of these positioned within 1 meter of yourself to be equivalent to 2,500 lux.  The bulbs can be placed in a standard light fixture.  Additionally, they can be placed in a mountable fixture that is used in greenhouses.  Mountable fixtures are also available at hardware stores for about $9.  Do not look directly at the light.  If you have any concerns regarding the safety of bright light therapy for you, it is recommended that you consult an ophthalmologist before using a light box.  If you have a condition that makes your eyes very sensitive to light,  "macular degeneration, a family history of such problems, or diabetic changes to your eyes, consult an ophthalmologist before using a light box. If you have anxiety disorder and have an increase in anxiety discontinue use.         MY TREATMENT INFORMATION FOR SLEEP APNEA-  Sid Zarco    DOCTOR : Bennett Ezra Goltz, PA-C  SLEEP CENTER : [facility]     MY CONTACT NUMBER: [phone]    Am I having a sleep study at a sleep center?  Make sure you have an appointment for the study before you leave!    Am I having a home sleep study?  Watch this video:  https://www.NearWoo.com/watch?v=CteI_GhyP9g&list=PLC4F_[email or web address]    Frequently asked questions:  1. What is Obstructive Sleep Apnea (KELLEY)? KELLEY is the most common type of sleep apnea. Apnea means, \"without breath.\"  Apnea is most often caused by narrowing or collapse of the upper airway as muscles relax during sleep.   Almost everyone has occasional apneas. Most people with sleep apnea have had brief interruptions at night frequently for many years.  The severity of sleep apnea is related to how frequent and severe the events are.   2. What are the consequences of KELLEY? Symptoms include: feeling sleepy during the day, snoring loudly, gasping or stopping of breathing, trouble sleeping, and occasionally morning headaches or heartburn at night.  Sleepiness can be serious and even increase the risk of falling asleep while driving. Other health consequences may include development of high blood pressure and other cardiovascular disease in persons who are susceptible. Untreated KELLEY  can contribute to heart disease, stroke and diabetes.   3. What are the treatment options? In most situations, sleep apnea is a lifelong disease that must be managed with daily therapy. Medications are not effective for sleep apnea and surgery is generally not considered until other therapies have been tried. Your treatment is your choice. Continuous Positive Airway (CPAP) works right " away and is the therapy that is effective in nearly everyone. An oral device to hold your jaw forward is usually the next most reliable option. Other options include postioning devices (to keep you off your back), weight loss, and surgery including a tongue pacing device. There is more detail about some of these options below.    Important tips for using CPAP and similar devices   Know your equipment:  CPAP is continuous positive airway pressure that prevents obstructive sleep apnea by keeping the throat from collapsing while you are sleeping. In most cases, the device is  smart  and can slowly self-adjusts if your throat collapses and keeps a record every day of how well you are treated-this information is available to you and your care team.  BPAP is bilevel positive airway pressure that keeps your throat open and also assists each breath with a pressure boost to maintain adequate breathing.  Special kinds of BPAP are used in patients who have inadequate breathing from lung or heart disease. In most cases, the device is  smart  and can slowly self-adjusts to assist breathing. Like CPAP, the device keeps a record of how well you are treated.  Your mask is your connection to the device. You get to choose what feels most comfortable and the staff will help to make sure if fits. Here: are some examples of the different masks that are available:       Key points to remember on your journey with sleep apnea:  1. Sleep study.  PAP devices often need to be adjusted during a sleep study to show that they are effective and adjusted right.  2. Good tips to remember: Try wearing just the mask during a quiet time during the day so your body adapts to wearing it. A humidifier is recommended for comfort in most cases to prevent drying of your nose and throat. Allergy medication from your provider may help you if you are having nasal congestion.  3. Getting settled-in. It takes more than one night for most of us to get used to  wearing a mask. Try wearing just the mask during a quiet time during the day so your body adapts to wearing it. A humidifier is recommended for comfort in most cases. Our team will work with you carefully on the first day and will be in contact within 4 days and again at 2 and 4 weeks for advice and remote device adjustments. Your therapy is evaluated by the device each day.   4. Use it every night. The more you are able to sleep naturally for 7-8 hours, the more likely you will have good sleep and to prevent health risks or symptoms from sleep apnea. Even if you use it 4 hours it helps. Occasionally all of us are unable to use a medical therapy, in sleep apnea, it is not dangerous to miss one night.   5. Communicate. Call our skilled team on the number provided on the first day if your visit for problems that make it difficult to wear the device. Over 2 out of 3 patients can learn to wear the device long-term with help from our team. Remember to call our team or your sleep providers if you are unable to wear the device as we may have other solutions for those who cannot adapt to mask CPAP therapy. It is recommended that you sleep your sleep provider within the first 3 months and yearly after that if you are not having problems.   Take care of your equipment. Make sure you clean your mask and tubing using directions every day and that your filter and mask are replaced as recommended or if they are not working.     BESIDES CPAP, WHAT OTHER THERAPIES ARE THERE?    Positioning Device  Positioning devices are generally used when sleep apnea is mild and only occurs on your back.This example shows a pillow that straps around the waist. It may be appropriate for those whose sleep study shows milder sleep apnea that occurs primarily when lying flat on one's back. Preliminary studies have shown benefit but effectiveness at home may need to be verified by a home sleep test. These devices are generally not covered by medical  insurance.  Examples of devices that maintain sleeping on the back to prevent snoring and mild sleep apnea.    Belt type body positioner  Http://DreamCloset.com.com/    Electronic reminder  Http://nightshifttherapy.com/  Http://www.Awesomipod.[facility].au/    Oral Appliance  What is oral appliance therapy?  An oral appliance device fits on your teeth at night like a retainer used after having braces. The device is made by a specialized dentist and requires several visits over 1-2 months before a manufactured device is made to fit your teeth and is adjusted to prevent your sleep apnea. Once an oral device is working properly, snoring should be improved. A home sleep test may be recommended at that time if to determine whether the sleep apnea is adequately treated.       Some things to remember:  -Oral devices are often, but not always, covered by your medical insurance. Be sure to check with your insurance provider.   -If you are referred for oral therapy, you will be given a list of specialized dentists to consider or you may choose to visit the Web site of the American Academy of Dental Sleep Medicine  -Oral devices are less likely to work if you have severe sleep apnea or are extremely overweight.     More detailed information  An oral appliance is a small acrylic device that fits over the upper and lower teeth  (similar to a retainer or a mouth guard). This device slightly moves jaw forward, which moves the base of the tongue forward, opens the airway, improves breathing for effective treat snoring and obstructive sleep apnea in perhaps 7 out of 10 people .  The best working devices are custom-made by a dental device  after a mold is made of the teeth 1, 2, 3.  When is an oral appliance indicated?  Oral appliance therapy is recommended as a first-line treatment for patients with primary snoring, mild sleep apnea, and for patients with moderate sleep apnea who prefer appliance therapy to use of CPAP4, 5. Severity of  sleep apnea is determined by sleep testing and is based on the number of respiratory events per hour of sleep.   How successful is oral appliance therapy?  The success rate of oral appliance therapy in patients with mild sleep apnea is 75-80% while in patients with moderate sleep apnea it is 50-70%. The chance of success in patients with severe sleep apnea is 40-50%. The research also shows that oral appliances have a beneficial effect on the cardiovascular health of KELLEY patients at the same magnitude as CPAP therapy7.  Oral appliances should be a second-line treatment in cases of severe sleep apnea, but if not completely successful then a combination therapy utilizing CPAP plus oral appliance therapy may be effective. Oral appliances tend to be effective in a broad range of patients although studies show that the patients who have the highest success are females, younger patients, those with milder disease, and less severe obesity. 3, 6.   Finding a dentist that practices dental sleep medicine  Specific training is available through the American Academy of Dental Sleep Medicine for dentists interested in working in the field of sleep. To find a dentist who is educated in the field of sleep and the use of oral appliances, near you, visit the Web site of the American Academy of Dental Sleep Medicine.    References  1. Maylin, et al. Objectively measured vs self-reported compliance during oral appliance therapy for sleep-disordered breathing. Chest 2013; 144(5): 1029-8125.  2. Merly et al. Objective measurement of compliance during oral appliance therapy for sleep-disordered breathing. Thorax 2013; 68(1): 91-96.  3. Melissa, et al. Mandibular advancement devices in 620 men and women with KELLEY and snoring: tolerability and predictors of treatment success. Chest 2004; 125: 5650-5167.  4. Néstor et al. Oral appliances for snoring and KELLEY: a review. Sleep 2006; 29: 244-262.  5. Josafat et al. Oral  appliance treatment for KELLEY: an update. J Clin Sleep Med 2014; 10(2): 215-227.  6. Lakeisha et al. Predictors of OSAH treatment outcome. J Dent Res 2007; 86: 4065-2355.    Weight Loss:    Weight loss is a long-term strategy that may improve sleep apnea in some patients.    Weight management is a personal decision.  If you are interested in exploring weight loss strategies, the following discussion covers the impact on weight loss on sleep apnea and the approaches that may be successful.    Weight loss decreases severity of sleep apnea in most people with obesity. For those with mild obesity who have developed snoring with weight gain, even 15-30 pound weight loss can improve and occasionally eliminate sleep apnea.  Structured and life-long dietary and health habits are necessary to lose weight and keep healthier weight levels.     Though there may be significant health benefits from weight loss, long-term weight loss is very difficult to achieve- studies show success with dietary management in less than 10% of people. In addition, substantial weight loss may require years of dietary control and may be difficult if patients have severe obesity. In these cases, surgical management may be considered.  Finally, older individuals who have tolerated obesity without health complications may be less likely to benefit from weight loss strategies.        Your BMI is Body mass index is 31.87 kg/(m^2).  Weight management is a personal decision.  If you are interested in exploring weight loss strategies, the following discussion covers the approaches that may be successful. Body mass index (BMI) is one way to tell whether you are at a healthy weight, overweight, or obese. It measures your weight in relation to your height.  A BMI of 18.5 to 24.9 is in the healthy range. A person with a BMI of 25 to 29.9 is considered overweight, and someone with a BMI of 30 or greater is considered obese. More than two-thirds of American adults  are considered overweight or obese.  Being overweight or obese increases the risk for further weight gain. Excess weight may lead to heart disease and diabetes.  Creating and following plans for healthy eating and physical activity may help you improve your health.  Weight control is part of healthy lifestyle and includes exercise, emotional health, and healthy eating habits. Careful eating habits lifelong are the mainstay of weight control. Though there are significant health benefits from weight loss, long-term weight loss with diet alone may be very difficult to achieve- studies show long-term success with dietary management in less than 10% of people. Attaining a healthy weight may be especially difficult to achieve in those with severe obesity. In some cases, medications, devices and surgical management might be considered.  What can you do?  If you are overweight or obese and are interested in methods for weight loss, you should discuss this with your provider.     Consider reducing daily calorie intake by 500 calories.     Keep a food journal.     Avoiding skipping meals, consider cutting portions instead.    Diet combined with exercise helps maintain muscle while optimizing fat loss. Strength training is particularly important for building and maintaining muscle mass. Exercise helps reduce stress, increase energy, and improves fitness. Increasing exercise without diet control, however, may not burn enough calories to loose weight.       Start walking three days a week 10-20 minutes at a time    Work towards walking thirty minutes five days a week     Eventually, increase the speed of your walking for 1-2 minutes at time    In addition, we recommend that you review healthy lifestyles and methods for weight loss available through the National Institutes of Health patient information sites:  http://win.niddk.nih.gov/publications/index.htm    And look into health and wellness programs that may be available  through your health insurance provider, employer, local community center, or shane club.    Weight management plan: Patient was referred to their PCP to discuss a diet and exercise plan.    Surgery:    Surgery for obstructive sleep apnea is considered generally only when other therapies fail to work. Surgery may be discussed with you if you are having a difficult time tolerating CPAP and or when there is an abnormal structure that requires surgical correction.  Nose and throat surgeries often enlarge the airway to prevent collapse.  Most of these surgeries create pain for 1-2 weeks and up to half of the most common surgeries are not effective throughout life.  You should carefully discuss the benefits and drawbacks to surgery with your sleep provider and surgeon to determine if it is the best solution for you.   More information  Surgery for KELLEY is directed at areas that are responsible for narrowing or complete obstruction of the airway during sleep.  There are a wide range of procedures available to enlarge and/or stabilize the airway to prevent blockage of breathing in the three major areas where it can occur: the palate, tongue, and nasal regions.  Successful surgical treatment depends on the accurate identification of the factors responsible for obstructive sleep apnea in each person.  A personalized approach is required because there is no single treatment that works well for everyone.  Because of anatomic variation, consultation with an examination by a sleep surgeon is a critical first step in determining what surgical options are best for each patient.  In some cases, examination during sedation may be recommended in order to guide the selection of procedures.  Patients will be counseled about risks and benefits as well as the typical recovery course after surgery. Surgery is typically not a cure for a person s KELLEY.  However, surgery will often significantly improve one s KELLEY severity (termed  success  rate ).  Even in the absence of a cure, surgery will decrease the cardiovascular risk associated with OSA7; improve overall quality of life8 (sleepiness, functionality, sleep quality, etc).      Palate Procedures:  Patients with KELLEY often have narrowing of their airway in the region of their tonsils and uvula.  The goals of palate procedures are to widen the airway in this region as well as to help the tissues resist collapse.  Modern palate procedure techniques focus on tissue conservation and soft tissue rearrangement, rather than tissue removal.  Often the uvula is preserved in this procedure. Residual sleep apnea is common in patient after pharyngoplasty with an average reduction in sleep apnea events of 33%2.      Tongue Procedures:  ExamWhile patients are awake, the muscles that surround the throat are active and keep this region open for breathing. These muscles relax during sleep, allowing the tongue and other structures to collapse and block breathing.  There are several different tongue procedures available.  Selection of a tongue base procedure depends on characteristics seen on physical exam.  Generally, procedures are aimed at removing bulky tissues in this area or preventing the back of the tongue from falling back during sleep.  Success rates for tongue surgery range from 50-62%3.    Hypoglossal Nerve Stimulation:  Hypoglossal nerve stimulation has recently received approval from the [place] Food and Drug Administration for the treatment of obstructive sleep apnea.  This is based on research showing that the system was safe and effective in treating sleep apnea6.  Results showed that the median AHI score decreased 68%, from 29.3 to 9.0. This therapy uses an implant system that senses breathing patterns and delivers mild stimulation to airway muscles, which keeps the airway open during sleep.  The system consists of three fully implanted components: a small generator (similar in size to a  pacemaker), a breathing sensor, and a stimulation lead.  Using a small handheld remote, a patient turns the therapy on before bed and off upon awakening.    Candidates for this device must be greater than 22 years of age, have moderate to severe KELLEY (AHI between 20-65), BMI less than 32, have tried CPAP/oral appliance without success, and have appropriate upper airway anatomy (determined by a sleep endoscopy performed by Dr. Jane).    Hypoglossal Nerve Stimulation Pathway:    The sleep surgeon s office will work with the patient through the insurance prior-authorization process (including communications and appeals).    Nasal Procedures:  Nasal obstruction can interfere with nasal breathing during the day and night.  Studies have shown that relief of nasal obstruction can improve the ability of some patients to tolerate positive airway pressure therapy for obstructive sleep apnea1.  Treatment options include medications such as nasal saline, topical corticosteroid and antihistamine sprays, and oral medications such as antihistamines or decongestants. Non-surgical treatments can include external nasal dilators for selected patients. If these are not successful by themselves, surgery can improve the nasal airway either alone or in combination with these other options.      Combination Procedures:  Combination of surgical procedures and other treatments may be recommended, particularly if patients have more than one area of narrowing or persistent positional disease.  The success rate of combination surgery ranges from 66-80%2,3.    References  1. Sam MORENO. The Role of the Nose in Snoring and Obstructive Sleep Apnoea: An Update.  Eur Arch Otorhinolaryngol. 2011; 268: 1365-73.  2.  Hanna SM; Matthew JA; Jese JR; Pallanch JF; Ryne RENDON; Marky SG; Soham RICHARDS. Surgical modifications of the upper airway for obstructive sleep apnea in adults: a systematic review and meta-analysis. SLEEP 2010;33(10):6144-3981. Jessie  E. Hypopharyngeal surgery in obstructive sleep apnea: an evidence-based medicine review.  Arch Otolaryngol Head Neck Surg. 2006 Feb;132(2):206-13.  3. Marc YH1, Jens Y, Raul MELISSA. The efficacy of anatomically based multilevel surgery for obstructive sleep apnea. Otolaryngol Head Neck Surg. 2003 Oct;129(4):327-35.  4. Jessie CARUSO, Goldberg A. Hypopharyngeal Surgery in Obstructive Sleep Apnea: An Evidence-Based Medicine Review. Arch Otolaryngol Head Neck Surg. 2006 Feb;132(2):206-13.  5. Loren PJ et al. Upper-Airway Stimulation for Obstructive Sleep Apnea.  N Engl J Med. 2014 Jan 9;370(2):139-49.  6. Tay Y et al. Increased Incidence of Cardiovascular Disease in Middle-aged Men with Obstructive Sleep Apnea. Am J Respir Crit Care Med; 2002 166: 159-165  7. Saleem EM et al. Studying Life Effects and Effectiveness of Palatopharyngoplasty (SLEEP) study: Subjective Outcomes of Isolated Uvulopalatopharyngoplasty. Otolaryngol Head Neck Surg. 2011; 144: 623-631.            Follow-ups after your visit        Follow-up notes from your care team     Return in about 2 months (around 1/22/2018) for Insomnia follow up.      Your next 10 appointments already scheduled     Nov 27, 2017  2:00 PM CST   Oximetry  with BED 7 SH SLEEP   [facility] ([facility] [place])    [address]  [facility] [address]   [phone]            Nov 28, 2017 10:00 AM CST   Oximetry Drop Off with  SLEEP CENTER DME   [facility] ([facility] [place])    [address]  [facility] [address] [phone]            Jan 22, 2018  2:00 PM CST   Return Sleep Patient with Bennett Ezra Goltz, PA-C   [facility] ([facility] [place])    [address]  [facility] [address]   [phone]              Future tests that were ordered for you today     Open Future Orders        Priority Expected Expires  "Ordered    Overnight oximetry study Routine  2017            Who to contact     If you have questions or need follow up information about today's clinic visit or your schedule please contact [facility] directly at [phone].  Normal or non-critical lab and imaging results will be communicated to you by MyChart, letter or phone within 4 business days after the clinic has received the results. If you do not hear from us within 7 days, please contact the clinic through Golimihart or phone. If you have a critical or abnormal lab result, we will notify you by phone as soon as possible.  Submit refill requests through Revenew or call your pharmacy and they will forward the refill request to us. Please allow 3 business days for your refill to be completed.          Additional Information About Your Visit        [employer] Information     Revenew lets you send messages to your doctor, view your test results, renew your prescriptions, schedule appointments and more. To sign up, go to [email or web address] . Click on \"Log in\" on the left side of the screen, which will take you to the Welcome page. Then click on \"Sign up Now\" on the right side of the page.     You will be asked to enter the access code listed below, as well as some personal information. Please follow the directions to create your username and password.     Your access code is: [email or web address]  Expires: 2018  3:15 PM     Your access code will  in 90 days. If you need help or a new code, please call your [place] clinic or [phone].        Care EveryWhere ID     This is your Care EveryWhere ID. This could be used by other organizations to access your [place] medical records  [id number]        Your Vitals Were     Pulse Respirations Height Pulse Oximetry BMI (Body Mass Index)       73 16 1.829 m (6') 96% 31.87 kg/m2        Blood Pressure from Last 3 Encounters:   17 138/78   16 126/78   16 133/79 "    Weight from Last 3 Encounters:   11/22/17 106.6 kg (235 lb)   03/02/17 106.9 kg (235 lb 9.6 oz)   11/22/16 103.5 kg (228 lb 3.2 oz)              We Performed the Following     SLEEP EVALUATION & MANAGEMENT REFERRAL - ADULT -[facility] [place] [phone]  (Age 18 and up)        Primary Care Provider Office Phone # Fax #    Erik Newberry [phone] [address]  [facility] [address]        Equal Access to Services     [facility]: Hadii aad ku hadasho Soomaali, waaxda luqadaha, qaybta kaalmada adeegyada, shruthi cortez . So [facility] [phone].    ATENCIÓN: Si habla español, tiene a dodson disposición servicios gratuitos de asistencia lingüística. [facility] [phone].    We comply with applicable federal civil rights laws and [place] laws. We do not discriminate on the basis of race, color, national origin, age, disability, sex, sexual orientation, or gender identity.            Thank you!     Thank you for choosing [facility]  for your care. Our goal is always to provide you with excellent care. Hearing back from our patients is one way we can continue to improve our services. Please take a few minutes to complete the written survey that you may receive in the mail after your visit with us. Thank you!             Your Updated Medication List - Protect others around you: Learn how to safely use, store and throw away your medicines at www.disposemymeds.org.          This list is accurate as of: 11/22/17  3:24 PM.  Always use your most recent med list.                   Brand Name Dispense Instructions for use Diagnosis    albuterol 108 (90 BASE) MCG/ACT Inhaler    PROAIR HFA/PROVENTIL HFA/VENTOLIN HFA    1 Inhaler    Inhale 2 puffs into the lungs every 6 hours as needed for shortness of breath / dyspnea Okay to dispense any form of albuterol inhaler that is covered by insurance.    Mild persistent asthma, uncomplicated        ALPRAZolam 1 MG tablet    XANAX     as needed        divalproex 500 MG 24 hr tablet    DEPAKOTE ER    60 tablet    Take 1 tablet (500 mg) by mouth 2 times daily    Schizo-affective psychosis (H)       doxepin 10 MG capsule    SINEquan     Take 1 capsule (10 mg) by mouth At Bedtime        montelukast 10 MG tablet    SINGULAIR    90 tablet    Take 1 tablet (10 mg) by mouth At Bedtime    Mild persistent asthma, uncomplicated       VITAMIN D (CHOLECALCIFEROL) PO      Take 5,000 Units by mouth daily        zolpidem 10 MG tablet    AMBIEN     Take 10 mg by mouth nightly as needed

## 2017-11-27 ENCOUNTER — OFFICE VISIT (OUTPATIENT)
Dept: SLEEP MEDICINE | Facility: CLINIC | Age: 32
End: 2017-11-27
Attending: PHYSICIAN ASSISTANT

## 2017-11-27 DIAGNOSIS — Z82.0 FAMILY HISTORY OF SLEEP APNEA: ICD-10-CM

## 2017-11-27 DIAGNOSIS — R06.83 SNORING: ICD-10-CM

## 2017-11-27 NOTE — PROGRESS NOTES
Patient instructed on JESENIA use. Patient demonstrated and verbalized knowledge of use. Insurance was verified by financial securing. Device programmed. Device will be returned tomorrow before noon.      Jael SabillonRitchie  Sleep Clinic - Specialist

## 2017-11-27 NOTE — MR AVS SNAPSHOT
After Visit Summary   11/27/2017    Sid Zarco    MRN: [id number]           Patient Information     Date Of Birth          1985        Visit Information        Provider Department      11/27/2017 2:00 PM BED 7 SH SLEEP [facility]        Today's Diagnoses     Snoring        Family history of sleep apnea           Follow-ups after your visit        Your next 10 appointments already scheduled     Nov 27, 2017  2:00 PM CST   Oximetry  with BED 7 SH SLEEP   [facility] ([facility] [place] [facility])    [address]  [place] [address] [phone]            Nov 28, 2017 10:00 AM CST   Oximetry Drop Off with  SLEEP CENTER DME   [facility] ([facility] [place] [facility])    [address]  [place] [address] [phone]            Jan 22, 2018  2:00 PM CST   Return Sleep Patient with Bennett Ezra Goltz, PA-C   [facility] ([facility] [place])    [address]  [facility] [id number] [phone]              Who to contact     If you have questions or need follow up information about today's clinic visit or your schedule please contact [facility] directly at [phone].  Normal or non-critical lab and imaging results will be communicated to you by [employer] Humanity[employer], letter or phone within 4 business days after the clinic has received the results. If you do not hear from us within 7 days, please contact the clinic through MyChart or phone. If you have a critical or abnormal lab result, we will notify you by phone as soon as possible.  Submit refill requests through JobSync or call your pharmacy and they will forward the refill request to us. Please allow 3 business days for your refill to be completed.          Additional Information About Your Visit        JobSync Information     JobSync lets you send messages to your doctor, view  "your test results, renew your prescriptions, schedule appointments and more. To sign up, go to [email or web address] . Click on \"Log in\" on the left side of the screen, which will take you to the Welcome page. Then click on \"Sign up Now\" on the right side of the page.     You will be asked to enter the access code listed below, as well as some personal information. Please follow the directions to create your username and password.     Your access code is: [email or web address]  Expires: 2018  3:15 PM     Your access code will  in 90 days. If you need help or a new code, please call your [place] clinic or [phone].        Care EveryWhere ID     This is your Care EveryWhere ID. This could be used by other organizations to access your [place] medical records  [id number]         Blood Pressure from Last 3 Encounters:   17 138/78   16 126/78   16 133/79    Weight from Last 3 Encounters:   17 106.6 kg (235 lb)   17 106.9 kg (235 lb 9.6 oz)   16 103.5 kg (228 lb 3.2 oz)              We Performed the Following     Overnight oximetry study        Primary Care Provider Office Phone # Fax #    Erik Newberry [phone]       [address]  [facility] [address]        Equal Access to Services     [facility]ST[facility] MARTINE[facility]: Hadii aad ku hadasho Sodonaali, waaxda luqadaha, qaybta kaalmada adeegyada, shruthi rose. So [facility] [phone].    ATENCIÓN: Si habla español, tiene a dodson disposición servicios gratuitos de asistencia lingüística. Ruthy al [phone].    We comply with applicable federal civil rights laws and [place] laws. We do not discriminate on the basis of race, color, national origin, age, disability, sex, sexual orientation, or gender identity.            Thank you!     Thank you for choosing [facility]  for your care. Our goal is always to provide you with excellent care. Hearing back from our patients is one " way we can continue to improve our services. Please take a few minutes to complete the written survey that you may receive in the mail after your visit with us. Thank you!             Your Updated Medication List - Protect others around you: Learn how to safely use, store and throw away your medicines at www.disposemymeds.org.          This list is accurate as of: 11/27/17  1:34 PM.  Always use your most recent med list.                   Brand Name Dispense Instructions for use Diagnosis    albuterol 108 (90 BASE) MCG/ACT Inhaler    PROAIR HFA/PROVENTIL HFA/VENTOLIN HFA    1 Inhaler    Inhale 2 puffs into the lungs every 6 hours as needed for shortness of breath / dyspnea Okay to dispense any form of albuterol inhaler that is covered by insurance.    Mild persistent asthma, uncomplicated       ALPRAZolam 1 MG tablet    XANAX     as needed        divalproex 500 MG 24 hr tablet    DEPAKOTE ER    60 tablet    Take 1 tablet (500 mg) by mouth 2 times daily    Schizo-affective psychosis (H)       doxepin 10 MG capsule    SINEquan     Take 1 capsule (10 mg) by mouth At Bedtime        montelukast 10 MG tablet    SINGULAIR    90 tablet    Take 1 tablet (10 mg) by mouth At Bedtime    Mild persistent asthma, uncomplicated       VITAMIN D (CHOLECALCIFEROL) PO      Take 5,000 Units by mouth daily        zolpidem 10 MG tablet    AMBIEN     Take 10 mg by mouth nightly as needed

## 2017-11-30 NOTE — PROGRESS NOTES
Oximetry results were obtained for the date of 11/28/2017.  The patient was on room air to screen for KELLEY.  The study showed a valid recording time of 9:17 with a 4% desaturation index of 4.7/hr.  The mean oxygen saturation was 91.7% and the lowest SpO2 was 86% (the report says 82%, but the graph does not support that).  The patient spent 1.2 minutes below 89% SpO2.    This oximetry test does not show significant apnea or hypoxemia, although there are times where is oxygen baseline sits at 90%.     He felt he was awake for 1 or 2 hours in the middle of the night, about 5 AM.   We reviewed the results, no further testing is indicated. We reviewed recommendations for limiting his time in bed again. He has not gotten the SAD lamp yet.  He was told to keep his follow up in January if still having trouble sleeping. If his sleep is improving to his satisfaction, he does not need to keep that appointment.  Bennett Goltz, PA-C

## 2019-01-31 ENCOUNTER — TRANSFERRED RECORDS (OUTPATIENT)
Dept: HEALTH INFORMATION MANAGEMENT | Facility: CLINIC | Age: 34
End: 2019-01-31

## 2019-02-08 ENCOUNTER — TRANSFERRED RECORDS (OUTPATIENT)
Dept: HEALTH INFORMATION MANAGEMENT | Facility: CLINIC | Age: 34
End: 2019-02-08

## 2019-03-04 ENCOUNTER — TRANSFERRED RECORDS (OUTPATIENT)
Dept: HEALTH INFORMATION MANAGEMENT | Facility: CLINIC | Age: 34
End: 2019-03-04

## 2019-03-22 DIAGNOSIS — G47.00 INSOMNIA: Primary | ICD-10-CM

## 2019-03-29 DIAGNOSIS — G47.30 SLEEP APNEA: Primary | ICD-10-CM

## 2019-07-26 ENCOUNTER — OFFICE VISIT (OUTPATIENT)
Dept: SLEEP MEDICINE | Facility: CLINIC | Age: 34
End: 2019-07-26
Payer: MEDICARE

## 2019-07-26 VITALS
WEIGHT: 231 LBS | SYSTOLIC BLOOD PRESSURE: 125 MMHG | DIASTOLIC BLOOD PRESSURE: 86 MMHG | HEART RATE: 78 BPM | OXYGEN SATURATION: 98 % | BODY MASS INDEX: 31.29 KG/M2 | HEIGHT: 72 IN

## 2019-07-26 DIAGNOSIS — G47.21 CIRCADIAN RHYTHM SLEEP DISORDER, DELAYED SLEEP PHASE TYPE: ICD-10-CM

## 2019-07-26 DIAGNOSIS — F51.04 CHRONIC INSOMNIA: Primary | ICD-10-CM

## 2019-07-26 PROCEDURE — 90791 PSYCH DIAGNOSTIC EVALUATION: CPT | Performed by: PSYCHOLOGIST

## 2019-07-26 RX ORDER — RAMELTEON 8 MG/1
TABLET, FILM COATED ORAL
Refills: 1 | COMMUNITY
Start: 2019-06-27 | End: 2022-06-13

## 2019-07-26 RX ORDER — SUVOREXANT 10 MG/1
TABLET, FILM COATED ORAL
Refills: 0 | COMMUNITY
Start: 2019-04-04 | End: 2019-08-15

## 2019-07-26 RX ORDER — TEMAZEPAM 15 MG/1
CAPSULE ORAL
Refills: 0 | COMMUNITY
Start: 2019-02-12 | End: 2019-08-15

## 2019-07-26 ASSESSMENT — ANXIETY QUESTIONNAIRES
3. WORRYING TOO MUCH ABOUT DIFFERENT THINGS: NOT AT ALL
1. FEELING NERVOUS, ANXIOUS, OR ON EDGE: SEVERAL DAYS
7. FEELING AFRAID AS IF SOMETHING AWFUL MIGHT HAPPEN: NOT AT ALL
GAD7 TOTAL SCORE: 4
5. BEING SO RESTLESS THAT IT IS HARD TO SIT STILL: NOT AT ALL
2. NOT BEING ABLE TO STOP OR CONTROL WORRYING: SEVERAL DAYS
6. BECOMING EASILY ANNOYED OR IRRITABLE: MORE THAN HALF THE DAYS

## 2019-07-26 ASSESSMENT — MIFFLIN-ST. JEOR: SCORE: 2025.81

## 2019-07-26 ASSESSMENT — PATIENT HEALTH QUESTIONNAIRE - PHQ9
5. POOR APPETITE OR OVEREATING: NOT AT ALL
SUM OF ALL RESPONSES TO PHQ QUESTIONS 1-9: 6

## 2019-07-26 NOTE — PROGRESS NOTES
SLEEP MEDICINE CONSULTATION  Sleep Psychology    Name: Sid Zarco MRN# [id number]   Age: 34 year old YOB: 1985     Date of Consultation: Jul 26, 2019  Consultation is requested by: DANE Orozco CNP  [facility]  [address] [place] [address] [address]  [place] [id number]  Primary care provider: Erik Newberry    Reason for Sleep Consultation     Sid Zarco is a 34 year old male seen today for a behavioral sleep medicine consultation because of insomnia.      Assessment and Plan     Sleep Diagnoses/Recommendations:    1. Chronic insomnia  History and clinical presentation suggest chronic multi factored insomnia associated with co-occurring mental health condition, delayed sleep phase circadian rhythm, and psychophysiologic features.  Patient was introduced to stimulus control therapy and prescribed a sleep window between 9:30 PM-6 AM.  He will continue to follow with his psychiatrist for management his sleep medications.  Consider use of vibrating alarm so as not to disturb his roommate.    2. Circadian rhythm sleep disorder, delayed sleep phase type  Attention to management of this patient sleep/wake tendency will be important in management of insomnia.  Recommended exposure to bright ambient light in the morning for 30-45minutes or use of full-spectrum bright light box, 10,000 Lux or geovany stimulator.  In the evening he will maintain his home environment at relatively low light setting and avoid use of screens/computers within an hour of bedtime.  Discussed potential contraindications for use of bright light box including in patients with bipolar disorder, risk of skin cancer or eye disease.      See patient instructions for initial treatment recommendations and behavioral sleep plan.    Summary Counseling:      Sid was provided information about the pathophysiology of insomnia and psychophysiological factors contributing to the onset and maintenance of insomnia associated with  delayed sleep phase circadian rhythm.  Treatment option were discussed including component of cognitive-behavioral therapy for insomnia. The benefits and potential early side effects of treatment including increased daytime sleepiness were discussed. She was advised to seek medical advise and consultation around use of or changes to prescription sleep medication, Patient was counseled on the importance of avoiding driving if drowsy.        Follow-up: 2 weeks     History of Present Sleep Complaint     Sid Zarco is a 34 year old year old male who reports a prior medical history of asthma, generalized anxiety disorder and schizoaffective disorder seen today for concern about chronic difficulty with initiation and maintenance of sleepDoes not have sleep apnea he currently is taking a combination of several sleep medications including Rozerem, Silenor, and eszopiclone.  He is also had previous trials of zolpidem, temazepam, Belsomra, Remeron.    Daytime symptoms include tiredness.  He does not report daytime sleepiness.  With use of medication he reports subclinical levels of insomnia.  Review of 2 weeks of sleep diary indicates significant variation in bed time usually anywhere between 8 PM-1 AM.  Sleep offset varies between 4:30 AM-10:30 AM.  Average time in bed is approximately 9.5 hours.  Average estimated total sleep time approaches 7.5 hours with medication.    Patient reports that he sleeps in the same room with a roommate.  His roommate wakes up usually between 10-11 AM.  He needs to get up by 6 AM in order to be able to get the classes this fall.  He is studying mathematics.  He is concerned about disrupting his roommate when he gets up by using alarm.  He occasionally naps usually for 1-1.5 hours    Patient does not report any chronic pain.  He does report sleep specific worry and concern.  He reports his bed is quiet and that his sleep environment is dark throughout the night.    Patient does snore.  Prior  sleep study does not reveal any overall sleep disordered breathing.  He has used a slumber bump positional device which he was told was for mild positional REM related apnea.    Previous Sleep Studies:    Patient was previously seen in 2017 by Bennett Goltz at the [facility] for evaluation of possible sleep apnea and insomnia.  He was diagnosed with insomnia unspecified and oximetry was completed due to modest concerns for possible sleep disordered breathing.  A sleep study was completed subsequently at the [facility] sleep center under the direction of Dr. Porfirio Amato.  The study was completed on 2/8/2019.  Study indicated a recording of 4 and 66 minutes with a total sleep time of 415 minutes he spent 394 total minutes in the supine position.  Sleep efficiency was 89%.  Sleep latency was 26 minutes and wake after sleep onset accounted for 25 minutes.  Sleep architecture was fragmented.  Total percentage of sleep time in each stage was 6%, stage I; 61.2%, stage II; 15.9%, stage III; and 16.9%, stage REM.  The overall apnea-hypopnea index was within normal limits at 4.3 events per hour.  Events were concentrated during stage REM sleep.  The respiratory disturbance index was 7.1 with mean oxygen saturation 94% and oxygen faith of 87%.  Impressions were that of primary snoring without meeting criteria for sleep apnea though it was noted that true severity was potentially underestimated due to reduced percentage of stage REM during this study.      Substance Use:    Tobacco: None reported   Caffeine: 0-1 caffeinated beverages usually in the morning.   Alcohol: None reported  Recreational Drugs: None reported      Screening          [place] Sleepiness Scale  Total score - [place]: 6 .    CARMEN Total Score: 13       PHQ-9 SCORE 7/26/2019   PHQ-9 Total Score -   PHQ-9 Total Score 6   PHQ-9 Total Score -       JODI-7 SCORE 4/18/2014 7/26/2019   Total Score - 4   Total Score BEH Adult 9 -       Patient  Activation Score     KARISHMA Score (Last Two) 12/7/2012   KARISHMA Raw Score 24   Activation Score 29.7   KARISHMA Level 1         Vitals     /86   Pulse 78   Ht 1.829 m (6')   Wt 104.8 kg (231 lb)   SpO2 98%   BMI 31.33 kg/m        Medical History     Patient Active Problem List   Diagnosis     Panic disorder     Generalized anxiety disorder     CARDIOVASCULAR SCREENING; LDL GOAL LESS THAN 160     Chronic mental illness     Schizophrenic episode, acute, chronic condition (H)     Schizo-affective psychosis (H)     Obesity (BMI 30-39.9)     Schizoaffective disorder, unspecified (H)     Mixed hyperlipidemia     Chronic insomnia        Current Outpatient Medications   Medication Sig Dispense Refill     albuterol (PROAIR HFA, PROVENTIL HFA, VENTOLIN HFA) 108 (90 BASE) MCG/ACT inhaler Inhale 2 puffs into the lungs every 6 hours as needed for shortness of breath / dyspnea Okay to dispense any form of albuterol inhaler that is covered by insurance. 1 Inhaler 1     ALPRAZolam (XANAX) 1 MG tablet as needed   3     BELSOMRA 10 MG tablet TK 1 T PO  HS PRN  0     ROZEREM 8 MG tablet TK 1/2 T PO HS  1     temazepam (RESTORIL) 15 MG capsule   0     zolpidem (AMBIEN) 10 MG tablet Take 10 mg by mouth nightly as needed  3     divalproex (DEPAKOTE ER) 500 MG 24 hr tablet Take 1 tablet (500 mg) by mouth 2 times daily 60 tablet 0     DoxePIN (SINEQUAN) 10 MG capsule Take 1 capsule (10 mg) by mouth At Bedtime       montelukast (SINGULAIR) 10 MG tablet Take 1 tablet (10 mg) by mouth At Bedtime 90 tablet 1     VITAMIN D, CHOLECALCIFEROL, PO Take 5,000 Units by mouth daily         Past Surgical History:   Procedure Laterality Date     NO HISTORY OF SURGERY       none            Allergies   Allergen Reactions     Dust Mites      Mildew          Psychiatric History     Prior Psychiatric Diagnoses: yes, reported history of schizoaffective disorder, generalized anxiety   Psychiatric Hospitalizations: none reported states he is currently followed  by psychiatry   Use of Psychotropics yes, eszopiclone, Silenor, Remeron, alprazolam      Chemical Use     Prior Chemical Dependency Treatment: none         Family History     Family History   Problem Relation Age of Onset     Schizophrenia Mother      Diabetes Mother      Bipolar Disorder Father      Sleep Apnea Father      Anxiety Disorder Brother      Schizophrenia Brother      Depression Brother      Bipolar Disorder Brother      Sleep Apnea Brother      Schizophrenia Other      Diabetes Maternal Grandfather      Diabetes Paternal Grandmother        Sleep Disorders: None reported    Social History     Social History     Socioeconomic History     Marital status: Single     Spouse name: None     Number of children: 0     Years of education: None     Highest education level: None   Occupational History     Employer: UNEMPLOYED   Social Needs     Financial resource strain: None     Food insecurity:     Worry: None     Inability: None     Transportation needs:     Medical: None     Non-medical: None   Tobacco Use     Smoking status: Former Smoker     Packs/day: 1.50     Types: Cigarettes     Last attempt to quit:      Years since quittin.5     Smokeless tobacco: Never Used     Tobacco comment: <1 ppd for most of the time (5-6 years)   Substance and Sexual Activity     Alcohol use: Yes     Comment: socially, on holidays     Drug use: No     Sexual activity: Never   Lifestyle     Physical activity:     Days per week: None     Minutes per session: None     Stress: None   Relationships     Social connections:     Talks on phone: None     Gets together: None     Attends Jewish service: None     Active member of club or organization: None     Attends meetings of clubs or organizations: None     Relationship status: None     Intimate partner violence:     Fear of current or ex partner: None     Emotionally abused: None     Physically abused: None     Forced sexual activity: None   Other Topics Concern      Parent/sibling w/ CABG, MI or angioplasty before 65F 55M? Not Asked   Social History Narrative     None       Single, lives with roommate.  They share same room.  Patient is currently a mathematics student.     Mental Status Examination     Sid presented as appropriately dressed and groomed and was oriented X3 with speech language intact.  The patient was cooperative throughout the evaluation with no signs of hallucinations, delusions, loosening of associations or other thought disturbance.  Mood was somewhat anxious.  Affect was congruent with mood. Insight and judgement we intact.  Memory was intact for recent and remote elements.  There was no report of suicidal ideation, intention or plan. Attention and concentration were within normal.      Time Spent: 50 minutes    Copy:   Erik Newberry APRN CNP  [facility]  [address] [place] [address] [address]  [place] [id number]    Den Brush PsyD, LP, DBSM  Diplomate, Behavioral Sleep Medicine  [facility]  [place] [facility] [place]

## 2019-07-26 NOTE — PATIENT INSTRUCTIONS
Insomnia  Is multifactored, associated with delayed sleep phase circadian rhythm with some contribution from stress related factors as well.    When someone lays awake in bed over many nights, your body can actually learn to be awake in bed, mainly because that is what has happened so many times.  Your body has actually been  conditioned  or trained to be awake during the night because it has happened so often.  To break this habit you should try to follow these steps to improve your insomnia:    Set a strict bedtime and rise time to keep every day of the week, including weekends, e.g. Go to bed at 930 PM and get up at 6 AM with vibrating alarm.    Go to bed at the set time, but only if you are sleepy (not tired or fatigued but drowsy)    Don t lay in bed for more than 15-30 minutes if you can t sleep.  Get up and go do something relaxing like reading or watching TV until you get drowsy again     Get up at the same time every day regardless of how much sleep you get    Use the bedroom only for sleep and sex.  Do NOT watch TV, read, use the computer, play on your cell phone or do work while in bed      Do not take naps during the daytime and avoid any situations where you might get drowsy or fall asleep unintentionally especially in the evening.      Important that you get adequate exposure to bright light in the morning.  Consider use of a bright light box at bedside with time (think a kind of geovany simulator) to help promote waking and strengthening the consistent timing of sleep wake cycle.    Take medications as prescribed about 30-45 minutes before bed.    Avoid use of screens about an hour before bed or use blue light blocking screens on these devices.    Use CBT I  to record you sleep in the morning.

## 2019-07-26 NOTE — NURSING NOTE
Chief Complaint   Patient presents with     Sleep Problem     consult- possible insomnia       Initial /86   Pulse 78   Ht 1.829 m (6')   Wt 104.8 kg (231 lb)   SpO2 98%   BMI 31.33 kg/m   Estimated body mass index is 31.33 kg/m  as calculated from the following:    Height as of this encounter: 1.829 m (6').    Weight as of this encounter: 104.8 kg (231 lb).    Medication Reconciliation: complete    Neck circumference: 16 inches / 41 centimeters.

## 2019-07-27 ASSESSMENT — ANXIETY QUESTIONNAIRES: GAD7 TOTAL SCORE: 4

## 2019-08-15 ENCOUNTER — OFFICE VISIT (OUTPATIENT)
Dept: SLEEP MEDICINE | Facility: CLINIC | Age: 34
End: 2019-08-15
Payer: MEDICARE

## 2019-08-15 VITALS
DIASTOLIC BLOOD PRESSURE: 76 MMHG | SYSTOLIC BLOOD PRESSURE: 119 MMHG | OXYGEN SATURATION: 97 % | HEART RATE: 73 BPM | HEIGHT: 72 IN | BODY MASS INDEX: 31.42 KG/M2 | WEIGHT: 232 LBS

## 2019-08-15 DIAGNOSIS — F51.04 CHRONIC INSOMNIA: Primary | ICD-10-CM

## 2019-08-15 DIAGNOSIS — G47.21 CIRCADIAN RHYTHM SLEEP DISORDER, DELAYED SLEEP PHASE TYPE: ICD-10-CM

## 2019-08-15 PROCEDURE — 90834 PSYTX W PT 45 MINUTES: CPT | Performed by: PSYCHOLOGIST

## 2019-08-15 RX ORDER — DOXEPIN HYDROCHLORIDE 6 MG/1
TABLET ORAL
Refills: 2 | COMMUNITY
Start: 2019-07-25 | End: 2021-09-14

## 2019-08-15 ASSESSMENT — MIFFLIN-ST. JEOR: SCORE: 2030.35

## 2019-08-15 NOTE — PATIENT INSTRUCTIONS
For your sleep, gradually advance your sleep schedule from 10 AM to 7 AM.    OK to watch TV to wind down so long as room dimmed and use energy saving to reduce light emission from TV.    Consider organizing your next day before you relax in the evening, noting things you need to get dome and letting go of these things until the next day.      Confer with darleen prescriber around use of your sleep medications and use of alprazolam in the middle of the night for anxiety if you find you cant return back to sleep because of it.

## 2019-08-15 NOTE — NURSING NOTE
Chief Complaint   Patient presents with     RECHECK       Initial /76   Pulse 73   Ht 1.829 m (6')   Wt 105.2 kg (232 lb)   SpO2 97%   BMI 31.46 kg/m   Estimated body mass index is 31.46 kg/m  as calculated from the following:    Height as of this encounter: 1.829 m (6').    Weight as of this encounter: 105.2 kg (232 lb).    Medication Reconciliation: complete

## 2019-08-16 NOTE — PROGRESS NOTES
SLEEP MEDICINE PROGRESS NOTE  Sleep Psychology    Patient Name: Sid Zarco  MRN:  [id number]  Date of Service: Aug 15, 2019       Subjective Report     Sid Zarco returns to the sleep clinic today to discuss progress in implementing behavioral strategies for the management of chronic insomnia with delayed sleep phase circadian rhythm.  Sid reports some improvement in overall sleep quality.  He cites significant stress including loss of scholarship he had anticipated and stress in anticipation of starting school as factors to his difficulty in establishing and advancing his sleep phase to 6 AM.    We revisited the process of advancing sleep phase and he this time was open to a graduated approach.  His average wake time is now 10 AM.  Over the next 2 weeks he will advance his sleep phase by 1 hour and set what will likely be a more realistic goal of the 7 AM wake time.  Discussed advancing his evening routine to reduce exposure to evening light.  I also discussed his relaxation routine involving watching TV and strategies to mitigate the effects of light while engaging in this activity.    Patient to continue to follow with his for medication management and psychiatry team..     .     Sleep Data:     Source of Data: CBT-I     Sleep Latency: 14 min.  Times Awakened: 2  Wake Time After Sleep Onset: 44 min.  Total Sleep Time: 6 hours 10 min.  Sleep Efficiency: 89 %    Total score - [place]: 3 .    CARMEN Total Score: 11       Interventions     Strategies and recommendations including stimulus control, gradual advancement of sleep phase were discussed today.       Vital Signs     /76   Pulse 73   Ht 1.829 m (6')   Wt 105.2 kg (232 lb)   SpO2 97%   BMI 31.46 kg/m       Mental Status     Appearance:  Well kept  Orientation:  X3  Mood:  better  Affect:  Congruent with mood  Speech/Language:  Normal  Thought Process: Intact  Associations:  Normal  Thought Content: Normal    Diagnostic Impressions and Plan        1. Chronic insomnia  Improved sleep latency, reduced wake after sleep onset and improve sleep efficiency.  However he has not been able to advance his sleep phase significantly.  Continue stimulus control and follow with his medication management provider for overall management of his sleep medications    2. Circadian rhythm sleep disorder, delayed sleep phase type  Over the next 2 weeks patient will advance sleep phase from a stable 10 AM to 7 AM, advancing 1 hour every 3-4 days.  He will ensure he gets adequate exposure to light in the morning, maintain a dark sleep environment and avoid bright lights, computer use within an hour-2 before bed.  His relaxing routine will involve watching TV but he will turn energy level low and engage a bluelight blocking mechanism.        Follow-up: 5 weeks    Time Spent: 45 minutes      Den Brsuh PsyD, RYANN, DBSM  Diplomate, Behavioral Sleep Medicine  [facility] [place] [facility] [place]

## 2019-10-18 ENCOUNTER — OFFICE VISIT (OUTPATIENT)
Dept: SLEEP MEDICINE | Facility: CLINIC | Age: 34
End: 2019-10-18
Payer: MEDICARE

## 2019-10-18 VITALS
OXYGEN SATURATION: 95 % | SYSTOLIC BLOOD PRESSURE: 128 MMHG | WEIGHT: 230 LBS | HEIGHT: 72 IN | BODY MASS INDEX: 31.15 KG/M2 | HEART RATE: 82 BPM | DIASTOLIC BLOOD PRESSURE: 82 MMHG

## 2019-10-18 DIAGNOSIS — F51.04 CHRONIC INSOMNIA: Primary | ICD-10-CM

## 2019-10-18 DIAGNOSIS — G47.21 CIRCADIAN RHYTHM SLEEP DISORDER, DELAYED SLEEP PHASE TYPE: ICD-10-CM

## 2019-10-18 PROCEDURE — 90834 PSYTX W PT 45 MINUTES: CPT | Performed by: PSYCHOLOGIST

## 2019-10-18 RX ORDER — LAMOTRIGINE 25 MG/1
100 TABLET ORAL
Refills: 1 | COMMUNITY
Start: 2019-08-22 | End: 2021-09-13

## 2019-10-18 RX ORDER — ESZOPICLONE 3 MG/1
TABLET, FILM COATED ORAL
Refills: 3 | COMMUNITY
Start: 2019-09-27 | End: 2021-09-13

## 2019-10-18 ASSESSMENT — MIFFLIN-ST. JEOR: SCORE: 2021.27

## 2019-10-18 NOTE — PROGRESS NOTES
SLEEP MEDICINE PROGRESS NOTE  Sleep Psychology    Patient Name: Sid Zarco  MRN:  [id number]  Date of Service: Oct 18, 2019       Subjective Report     Sid Zarco returns to the sleep clinic today to discuss progress in implementing behavioral strategies for the management of chronic insomnia with delayed sleep phase circadian rhythm tendency.  iSd reports his sleep is deteriorated somewhat.  He attributes it to introduction of lamotrigine for treatment of mood disorder and also increased stress..  Patient reports that he is getting about 6 hours of sleep and on some nights only 4 hours of sleep.  He wondered about whether naps may be helpful.  Discussed taking nap within 7 hours of wake time.  Brief naps were discussed as way to manage alertness and reduce their impact on next night sleep.     .     Sleep Data:     Source of Data: CBT-I     Sleep Latency: 30 min.  Times Awakened: 3  Wake Time After Sleep Onset: Greater than 30 min.  Total Sleep Time: 6 hours 0 min.  Sleep Efficiency: Less than 85 %    Total score - [place]: 1 .    CARMEN Total Score: 19       Interventions     Strategies and recommendations including management of delayed sleep phase, circadian rhythm stimulus control, the role of mood and sleep.  Were discussed today.       Vital Signs     /82   Pulse 82   Ht 1.829 m (6')   Wt 104.3 kg (230 lb)   SpO2 95%   BMI 31.19 kg/m       Mental Status     Appearance:  Well kept  Orientation:  X3  Mood:  anxious  Affect:  Congruent with mood  Speech/Language:  Normal  Thought Process: Intact  Associations:  Normal  Thought Content: Normal    Diagnostic Impressions and Plan       1. Chronic insomnia  Some increased insomnia symptoms noted.  Patient notes that he did start lamotrigine for mood disorder feels that his sleep has worsened and introduction of medication.  He is maintaining a consistent wake time and using a Don simulator for consistent sleep offset.  The total sleep time per self  report has been reduced somewhat to 6 hours.  We discussed that brief naps may be helpful to maintain alertness in late afternoon.    2. Circadian rhythm sleep disorder, delayed sleep phase type  Continue to use Don simulator and appropriate sleep hygiene in the evening.        Follow-up:   as needed    Time Spent: 40 minutes      Den Brush PsyD, RYANN, SHREYAM  Diplomate, Behavioral Sleep Medicine  [facility] [place] [facility]

## 2019-10-18 NOTE — PATIENT INSTRUCTIONS
Your BMI is Body mass index is 31.19 kg/m .  Weight management is a personal decision.  If you are interested in exploring weight loss strategies, the following discussion covers the approaches that may be successful. Body mass index (BMI) is one way to tell whether you are at a healthy weight, overweight, or obese. It measures your weight in relation to your height.  A BMI of 18.5 to 24.9 is in the healthy range. A person with a BMI of 25 to 29.9 is considered overweight, and someone with a BMI of 30 or greater is considered obese. More than two-thirds of American adults are considered overweight or obese.  Being overweight or obese increases the risk for further weight gain. Excess weight may lead to heart disease and diabetes.  Creating and following plans for healthy eating and physical activity may help you improve your health.  Weight control is part of healthy lifestyle and includes exercise, emotional health, and healthy eating habits. Careful eating habits lifelong are the mainstay of weight control. Though there are significant health benefits from weight loss, long-term weight loss with diet alone may be very difficult to achieve- studies show long-term success with dietary management in less than 10% of people. Attaining a healthy weight may be especially difficult to achieve in those with severe obesity. In some cases, medications, devices and surgical management might be considered.  What can you do?  If you are overweight or obese and are interested in methods for weight loss, you should discuss this with your provider.     Consider reducing daily calorie intake by 500 calories.     Keep a food journal.     Avoiding skipping meals, consider cutting portions instead.    Diet combined with exercise helps maintain muscle while optimizing fat loss. Strength training is particularly important for building and maintaining muscle mass. Exercise helps reduce stress, increase energy, and improves fitness.  Increasing exercise without diet control, however, may not burn enough calories to loose weight.       Start walking three days a week 10-20 minutes at a time    Work towards walking thirty minutes five days a week     Eventually, increase the speed of your walking for 1-2 minutes at time    In addition, we recommend that you review healthy lifestyles and methods for weight loss available through the National Institutes of Health patient information sites:  http://win.niddk.nih.gov/publications/index.htm    And look into health and wellness programs that may be available through your health insurance provider, employer, local community center, or shane club.    Weight management plan: Patient was referred to their PCP to discuss a diet and exercise plan.

## 2020-01-04 ENCOUNTER — OFFICE VISIT (OUTPATIENT)
Dept: PEDIATRICS | Facility: CLINIC | Age: 35
End: 2020-01-04
Payer: MEDICARE

## 2020-01-04 VITALS
TEMPERATURE: 96.2 F | BODY MASS INDEX: 31.41 KG/M2 | DIASTOLIC BLOOD PRESSURE: 80 MMHG | OXYGEN SATURATION: 96 % | HEART RATE: 94 BPM | WEIGHT: 231.6 LBS | SYSTOLIC BLOOD PRESSURE: 127 MMHG

## 2020-01-04 DIAGNOSIS — F25.9 SCHIZOAFFECTIVE DISORDER, UNSPECIFIED TYPE (H): ICD-10-CM

## 2020-01-04 DIAGNOSIS — F51.04 CHRONIC INSOMNIA: ICD-10-CM

## 2020-01-04 DIAGNOSIS — J45.20 MILD INTERMITTENT ASTHMA WITHOUT COMPLICATION: ICD-10-CM

## 2020-01-04 DIAGNOSIS — G47.21 CIRCADIAN RHYTHM SLEEP DISORDER, DELAYED SLEEP PHASE TYPE: ICD-10-CM

## 2020-01-04 DIAGNOSIS — Z86.69 HISTORY OF EPILEPSY: Primary | ICD-10-CM

## 2020-01-04 PROCEDURE — 99204 OFFICE O/P NEW MOD 45 MIN: CPT | Mod: 25 | Performed by: FAMILY MEDICINE

## 2020-01-04 PROCEDURE — G0008 ADMIN INFLUENZA VIRUS VAC: HCPCS | Performed by: FAMILY MEDICINE

## 2020-01-04 PROCEDURE — 90686 IIV4 VACC NO PRSV 0.5 ML IM: CPT | Performed by: FAMILY MEDICINE

## 2020-01-04 RX ORDER — ALBUTEROL SULFATE 90 UG/1
2 AEROSOL, METERED RESPIRATORY (INHALATION) EVERY 6 HOURS PRN
Qty: 1 INHALER | Refills: 1 | Status: SHIPPED | OUTPATIENT
Start: 2020-01-04 | End: 2021-09-13

## 2020-01-04 NOTE — LETTER
My Asthma Action Plan  Name: Sid Zarco   YOB: 1985  Date: 1/4/2020   My doctor: Erik Newberry   My clinic: [facility]      My Control Medicine: none        Dose:   My Rescue Medicine: Albuterol        Dose:    My Asthma Severity: Intermittent / Exercise Induced  Avoid your asthma triggers: upper respiratory infections and exercise or sports        GREEN ZONE   Good Control    I feel good    No cough or wheeze    Can work, sleep and play without asthma symptoms       Take your asthma control medicine every day.     1. If exercise triggers your asthma, take your rescue medication    15 minutes before exercise or sports, and    During exercise if you have asthma symptoms  2. Spacer to use with inhaler: If you have a spacer, make sure to use it with your inhaler             YELLOW ZONE Getting Worse  I have ANY of these:    I do not feel good    Cough or wheeze    Chest feels tight    Wake up at night   1. Keep taking your Green Zone medications  2. Start taking your rescue medicine:    every 20 minutes for up to 1 hour. Then every 4 hours for 24-48 hours.  3. If you stay in the Yellow Zone for more than 12-24 hours, contact your doctor.  4. If you do not return to the Green Zone in 12-24 hours or you get worse, start taking your oral steroid medicine if prescribed by your provider.           RED ZONE Medical Alert - Get Help  I have ANY of these:    I feel awful    Medicine is not helping    Breathing getting harder    Trouble walking or talking    Nose opens wide to breathe       1. Take your rescue medicine NOW  2. If your provider has prescribed an oral steroid medicine, start taking it NOW  3. Call your doctor NOW  4. If you are still in the Red Zone after 20 minutes and you have not reached your doctor:    Take your rescue medicine again and    Call 911 or go to the emergency room right away    See your regular doctor within 2 weeks of an Emergency Room or Urgent Care visit  for follow-up treatment.        The above medication may be given at school or day care?: N/A (Adult Patient)  Child can carry and use inhaler(s) at school with approval of school nurse?: N/A (Adult Patient)    Electronically signed by: Erik Newberry MD, January 4, 2020    Annual Reminders:  Meet with Asthma Educator,  Flu Shot in the Fall, consider Pneumonia Vaccination for patients with asthma (aged 19 and older).    Pharmacy: [id number] [id number] AT [facility] & [address]                    Asthma Triggers  How To Control Things That Make Your Asthma Worse    Triggers are things that make your asthma worse.  Look at the list below to help you find your triggers and what you can do about them.  You can help prevent asthma flare-ups by staying away from your triggers.      Trigger                                                          What you can do   Cigarette Smoke  Tobacco smoke can make asthma worse. Do not allow smoking in your home, car or around you.  Be sure no one smokes at a child s day care or school.  If you smoke, ask your health care provider for ways to help you quit.  Ask family members to quit too.  Ask your health care provider for a referral to Quit Plan to help you quit smoking, or call 2-699-953-PLAN.     Colds, Flu, Bronchitis  These are common triggers of asthma. Wash your hands often.  Don t touch your eyes, nose or mouth.  Get a flu shot every year.     Dust Mites  These are tiny bugs that live in cloth or carpet. They are too small to see. Wash sheets and blankets in hot water every week.   Encase pillows and mattress in dust mite proof covers.  Avoid having carpet if you can. If you have carpet, vacuum weekly.   Use a dust mask and HEPA vacuum.   Pollen and Outdoor Mold  Some people are allergic to trees, grass, or weed pollen, or molds. Try to keep your windows closed.  Limit time out doors when pollen count is high.   Ask you  health care provider about taking medicine during allergy season.     Animal Dander  Some people are allergic to skin flakes, urine or saliva from pets with fur or feathers. Keep pets with fur or feathers out of your home.    If you can t keep the pet outdoors, then keep the pet out of your bedroom.  Keep the bedroom door closed.  Keep pets off cloth furniture and away from stuffed toys.     Mice, Rats, and Cockroaches  Some people are allergic to the waste from these pests.   Cover food and garbage.  Clean up spills and food crumbs.  Store grease in the refrigerator.   Keep food out of the bedroom.   Indoor Mold  This can be a trigger if your home has high moisture. Fix leaking faucets, pipes, or other sources of water.   Clean moldy surfaces.  Dehumidify basement if it is damp and smelly.   Smoke, Strong Odors, and Sprays  These can reduce air quality. Stay away from strong odors and sprays, such as perfume, powder, hair spray, paints, smoke incense, paint, cleaning products, candles and new carpet.   Exercise or Sports  Some people with asthma have this trigger. Be active!  Ask your doctor about taking medicine before sports or exercise to prevent symptoms.    Warm up for 5-10 minutes before and after sports or exercise.     Other Triggers of Asthma  Cold air:  Cover your nose and mouth with a scarf.  Sometimes laughing or crying can be a trigger.  Some medicines and food can trigger asthma.

## 2020-01-04 NOTE — PROGRESS NOTES
Subjective     Sid Zarco is a 34 year old male who presents to clinic today for the following health issues:    HPI   Patient who was seen by me in 2016 is here to establish care and to address other concerns as mentioned below  Patient has a past medical history significant for schizoaffective disorder, chronic insomnia, mild intermittent asthma and history of epilepsy at the age of 9 that started between 19 94-19 95 with the last episode which he believes was in 1997 with a reported loss of consciousness  Patient has brought the form from [facility] that he needs to be filled out.  Patient reported having negative EEG and no history of epilepsy with no more episodes of seizure activity in more than 20 years now  He sees Dr. Pham at primary care for his mental health medications and follow-up  He is currently seeing Den Brush in sleep clinic for his chronic insomnia, is taking Lunesta and Rozerem and doxepin.  He is currently taking Lamictal for his schizoaffective disorder which he does not think is helping with his mood    Concern - Loss of consciousness   Onset: epilepsia when he was a child, mentioned it at the [facility] and he is required to have this form completed every 4 years. Has not had an episode since he was a child.  Needs refills on asthma inhaler, will solange a loss of consciousness form to be completed  Last physical was done back in September 2017.        Asthma Follow-Up    Was ACT completed today?    Yes    ACT Total Scores 1/4/2020   ACT TOTAL SCORE -   ASTHMA ER VISITS -   ASTHMA HOSPITALIZATIONS -   ACT TOTAL SCORE (Goal Greater than or Equal to 20) 24   In the past 12 months, how many times did you visit the emergency room for your asthma without being admitted to the hospital? 0   In the past 12 months, how many times were you hospitalized overnight because of your asthma? 0       How many days per week do you miss taking your asthma controller medication?  I do not have an asthma controller  medication    Please describe any recent triggers for your asthma: None    Have you had any Emergency Room Visits, Urgent Care Visits, or Hospital Admissions since your last office visit?  No        Patient Active Problem List   Diagnosis     Panic disorder     Generalized anxiety disorder     CARDIOVASCULAR SCREENING; LDL GOAL LESS THAN 160     Chronic mental illness     Schizophrenic episode, acute, chronic condition (H)     Schizo-affective psychosis (H)     Obesity (BMI 30-39.9)     Schizoaffective disorder, unspecified (H)     Mixed hyperlipidemia     Chronic insomnia     History of epilepsy     Mild intermittent asthma without complication     Circadian rhythm sleep disorder, delayed sleep phase type     Past Surgical History:   Procedure Laterality Date     NO HISTORY OF SURGERY       none         Social History     Tobacco Use     Smoking status: Former Smoker     Packs/day: 1.50     Types: Cigarettes     Last attempt to quit:      Years since quittin.0     Smokeless tobacco: Never Used     Tobacco comment: <1 ppd for most of the time (5-6 years)   Substance Use Topics     Alcohol use: Yes     Comment: socially, on holidays     Family History   Problem Relation Age of Onset     Schizophrenia Mother      Diabetes Mother      Bipolar Disorder Father      Sleep Apnea Father      Anxiety Disorder Brother      Schizophrenia Brother      Depression Brother      Bipolar Disorder Brother      Sleep Apnea Brother      Schizophrenia Other      Diabetes Maternal Grandfather      Diabetes Paternal Grandmother          Current Outpatient Medications   Medication Sig Dispense Refill     albuterol (PROAIR HFA/PROVENTIL HFA/VENTOLIN HFA) 108 (90 Base) MCG/ACT inhaler Inhale 2 puffs into the lungs every 6 hours as needed for shortness of breath / dyspnea Okay to dispense any form of albuterol inhaler that is covered by insurance. 1 Inhaler 1     ALPRAZolam (XANAX) 1 MG tablet as needed   3     eszopiclone (LUNESTA)  3 MG tablet TK 1 T PO  HS PRN  3     lamoTRIgine (LAMICTAL) 25 MG tablet 100 mg   1     ROZEREM 8 MG tablet TK 1/2 T PO HS  1     SILENOR 6 MG tablet TK ONE T PO HS  2     Allergies   Allergen Reactions     Dust Mites      Mildew      Recent Labs   Lab Test 11/22/16  1050 03/22/16  1126 09/16/15  1512 03/12/15  1323 01/13/15 11/16/14  0739 11/08/14  2111   * 155*  --   --   --   --   --    HDL 51 42  --   --   --   --   --    TRIG 197* 198*  --   --   --   --   --    ALT  --   --  89* 35 31 66 50   CR  --   --   --   --  0.97 1.05 0.95   GFRESTIMATED  --   --   --   --   --  83 >90  Non  GFR Calc     GFRESTBLACK  --   --   --   --   --  >90   GFR Calc   >90   GFR Calc     POTASSIUM  --   --   --   --  5.1 3.8 4.0   TSH  --  1.33  --   --   --   --   --       BP Readings from Last 3 Encounters:   01/04/20 127/80   10/18/19 128/82   08/15/19 119/76    Wt Readings from Last 3 Encounters:   01/04/20 105.1 kg (231 lb 9.6 oz)   10/18/19 104.3 kg (230 lb)   08/15/19 105.2 kg (232 lb)                      Reviewed and updated as needed this visit by Provider         Review of Systems   ROS COMP: CONSTITUTIONAL: NEGATIVE for fever, chills, change in weight  INTEGUMENTARY/SKIN: NEGATIVE for worrisome rashes, moles or lesions  EYES: NEGATIVE for vision changes or irritation  ENT/MOUTH: NEGATIVE for ear, mouth and throat problems  RESP: NEGATIVE for significant cough or SOB  RESP: History of asthma  CV: NEGATIVE for chest pain, palpitations or peripheral edema  GI: NEGATIVE for nausea, abdominal pain, heartburn, or change in bowel habits  MUSCULOSKELETAL: NEGATIVE for significant arthralgias or myalgia  NEURO: NEGATIVE for weakness, dizziness or paresthesias and history of seizures  ENDOCRINE: NEGATIVE for temperature intolerance, skin/hair changes  HEME/ALLERGY/IMMUNE: NEGATIVE for bleeding problems  PSYCHIATRIC: History of schizoaffective disorder      Objective    BP  127/80 (BP Location: Right arm, Patient Position: Chair, Cuff Size: Adult Large)   Pulse 94   Temp 96.2  F (35.7  C) (Temporal)   Wt 105.1 kg (231 lb 9.6 oz)   SpO2 96%   BMI 31.41 kg/m    Body mass index is 31.41 kg/m .  Physical Exam   GENERAL: healthy, alert and no distress  NECK: no adenopathy, no asymmetry, masses, or scars and thyroid normal to palpation  RESP: lungs clear to auscultation - no rales, rhonchi or wheezes  CV: regular rate and rhythm, normal S1 S2, no S3 or S4, no murmur, click or rub, no peripheral edema and peripheral pulses strong  MS: no gross musculoskeletal defects noted, no edema  NEURO: Normal strength and tone, mentation intact and speech normal  PSYCH: mentation appears normal, affect normal/bright    Diagnostic Test Results:  Labs reviewed in Epic        Assessment & Plan     1. History of epilepsy  Reviewed negative EEG from 2015 that was done at [facility]  Last episode of seizures was in 1997  Forms for [employer] was filled today    2. Mild intermittent asthma without complication  Stable, refills given on albuterol inhaler  Recommended to get a flu shot today  - albuterol (PROAIR HFA/PROVENTIL HFA/VENTOLIN HFA) 108 (90 Base) MCG/ACT inhaler; Inhale 2 puffs into the lungs every 6 hours as needed for shortness of breath / dyspnea Okay to dispense any form of albuterol inhaler that is covered by insurance.  Dispense: 1 Inhaler; Refill: 1  - Asthma Action Plan (AAP)    3. Chronic insomnia  On Rozerem, Lunesta and Silenor  Continue to follow-up with [facility] sleep clinic    4. Schizoaffective disorder, unspecified type (H)  Patient sees Dr. Pham at primary care, on alprazolam and Lamictal which patient does not think is helping with his current mental health symptoms  Recommended patient to call to schedule for follow-up with a psychiatrist    5. Circadian rhythm sleep disorder, delayed sleep phase type  As above in problem #3         Recommended patient to call  to schedule for physical and fasting labs  Patient verbalised understanding and is agreeable to the plan.    See Patient Instructions    No follow-ups on file.    Erik Newberry MD  [facility]

## 2020-01-05 ASSESSMENT — ASTHMA QUESTIONNAIRES: ACT_TOTALSCORE: 24

## 2020-01-06 RX ORDER — ALBUTEROL SULFATE 90 UG/1
AEROSOL, METERED RESPIRATORY (INHALATION)
Qty: 25.5 G | Refills: 1 | OUTPATIENT
Start: 2020-01-06

## 2020-01-06 NOTE — TELEPHONE ENCOUNTER
This is the medicine to use as needed for his mild intermittent asthma-no 90 days supply at this time  If he has been using rescue inhaler every day, we need to start him on a steroid inhaler for maintenance

## 2020-01-06 NOTE — TELEPHONE ENCOUNTER
Requesting 90 day supply. Routing to provider for review and approval.    Rain Foster, RN, BSN, PHN  .[facility]

## 2020-01-28 DIAGNOSIS — Z13.6 CARDIOVASCULAR SCREENING; LDL GOAL LESS THAN 160: ICD-10-CM

## 2020-01-28 DIAGNOSIS — Z00.00 ROUTINE GENERAL MEDICAL EXAMINATION AT A HEALTH CARE FACILITY: Primary | ICD-10-CM

## 2020-01-28 DIAGNOSIS — Z13.1 SCREENING FOR DIABETES MELLITUS (DM): ICD-10-CM

## 2020-01-28 DIAGNOSIS — Z13.29 SCREENING FOR THYROID DISORDER: ICD-10-CM

## 2020-01-28 DIAGNOSIS — Z13.0 SCREENING FOR DEFICIENCY ANEMIA: ICD-10-CM

## 2020-02-14 ENCOUNTER — OFFICE VISIT (OUTPATIENT)
Dept: PEDIATRICS | Facility: CLINIC | Age: 35
End: 2020-02-14
Payer: MEDICARE

## 2020-02-14 VITALS
DIASTOLIC BLOOD PRESSURE: 64 MMHG | HEIGHT: 72 IN | WEIGHT: 232.4 LBS | HEART RATE: 82 BPM | OXYGEN SATURATION: 97 % | TEMPERATURE: 98.4 F | SYSTOLIC BLOOD PRESSURE: 133 MMHG | BODY MASS INDEX: 31.48 KG/M2

## 2020-02-14 DIAGNOSIS — Z13.1 SCREENING FOR DIABETES MELLITUS (DM): ICD-10-CM

## 2020-02-14 DIAGNOSIS — E66.9 OBESITY (BMI 30-39.9): ICD-10-CM

## 2020-02-14 DIAGNOSIS — Z13.6 CARDIOVASCULAR SCREENING; LDL GOAL LESS THAN 160: ICD-10-CM

## 2020-02-14 DIAGNOSIS — G47.21 CIRCADIAN RHYTHM SLEEP DISORDER, DELAYED SLEEP PHASE TYPE: ICD-10-CM

## 2020-02-14 DIAGNOSIS — E78.2 MIXED HYPERLIPIDEMIA: ICD-10-CM

## 2020-02-14 DIAGNOSIS — F25.9 SCHIZOAFFECTIVE DISORDER, UNSPECIFIED TYPE (H): ICD-10-CM

## 2020-02-14 DIAGNOSIS — Z13.0 SCREENING FOR DEFICIENCY ANEMIA: ICD-10-CM

## 2020-02-14 DIAGNOSIS — J45.20 MILD INTERMITTENT ASTHMA WITHOUT COMPLICATION: ICD-10-CM

## 2020-02-14 DIAGNOSIS — Z00.00 ROUTINE GENERAL MEDICAL EXAMINATION AT A HEALTH CARE FACILITY: ICD-10-CM

## 2020-02-14 DIAGNOSIS — Z13.29 SCREENING FOR THYROID DISORDER: ICD-10-CM

## 2020-02-14 DIAGNOSIS — Z00.00 ENCOUNTER FOR MEDICARE ANNUAL WELLNESS EXAM: Primary | ICD-10-CM

## 2020-02-14 LAB
ALBUMIN SERPL-MCNC: 4.2 G/DL (ref 3.4–5)
ALP SERPL-CCNC: 81 U/L (ref 40–150)
ALT SERPL W P-5'-P-CCNC: 49 U/L (ref 0–70)
ANION GAP SERPL CALCULATED.3IONS-SCNC: 7 MMOL/L (ref 3–14)
AST SERPL W P-5'-P-CCNC: 27 U/L (ref 0–45)
BASOPHILS # BLD AUTO: 0.1 10E9/L (ref 0–0.2)
BASOPHILS NFR BLD AUTO: 0.8 %
BILIRUB SERPL-MCNC: 0.4 MG/DL (ref 0.2–1.3)
BUN SERPL-MCNC: 12 MG/DL (ref 7–30)
CALCIUM SERPL-MCNC: 9.2 MG/DL (ref 8.5–10.1)
CHLORIDE SERPL-SCNC: 105 MMOL/L (ref 94–109)
CHOLEST SERPL-MCNC: 257 MG/DL
CO2 SERPL-SCNC: 25 MMOL/L (ref 20–32)
CREAT SERPL-MCNC: 1.02 MG/DL (ref 0.66–1.25)
DIFFERENTIAL METHOD BLD: ABNORMAL
EOSINOPHIL # BLD AUTO: 0.2 10E9/L (ref 0–0.7)
EOSINOPHIL NFR BLD AUTO: 3.7 %
ERYTHROCYTE [DISTWIDTH] IN BLOOD BY AUTOMATED COUNT: 12.9 % (ref 10–15)
GFR SERPL CREATININE-BSD FRML MDRD: >90 ML/MIN/{1.73_M2}
GLUCOSE SERPL-MCNC: 95 MG/DL (ref 70–99)
HCT VFR BLD AUTO: 46.7 % (ref 40–53)
HDLC SERPL-MCNC: 51 MG/DL
HGB BLD-MCNC: 15.1 G/DL (ref 13.3–17.7)
IMM GRANULOCYTES # BLD: 0 10E9/L (ref 0–0.4)
IMM GRANULOCYTES NFR BLD: 0.3 %
LDLC SERPL CALC-MCNC: 148 MG/DL
LYMPHOCYTES # BLD AUTO: 2.1 10E9/L (ref 0.8–5.3)
LYMPHOCYTES NFR BLD AUTO: 34.6 %
MCH RBC QN AUTO: 25.7 PG (ref 26.5–33)
MCHC RBC AUTO-ENTMCNC: 32.3 G/DL (ref 31.5–36.5)
MCV RBC AUTO: 80 FL (ref 78–100)
MONOCYTES # BLD AUTO: 0.4 10E9/L (ref 0–1.3)
MONOCYTES NFR BLD AUTO: 6.8 %
NEUTROPHILS # BLD AUTO: 3.2 10E9/L (ref 1.6–8.3)
NEUTROPHILS NFR BLD AUTO: 53.8 %
NONHDLC SERPL-MCNC: 206 MG/DL
PLATELET # BLD AUTO: 265 10E9/L (ref 150–450)
POTASSIUM SERPL-SCNC: 4.3 MMOL/L (ref 3.4–5.3)
PROT SERPL-MCNC: 8.1 G/DL (ref 6.8–8.8)
RBC # BLD AUTO: 5.87 10E12/L (ref 4.4–5.9)
SODIUM SERPL-SCNC: 137 MMOL/L (ref 133–144)
TRIGL SERPL-MCNC: 290 MG/DL
TSH SERPL DL<=0.005 MIU/L-ACNC: 1.26 MU/L (ref 0.4–4)
WBC # BLD AUTO: 6 10E9/L (ref 4–11)

## 2020-02-14 PROCEDURE — 99395 PREV VISIT EST AGE 18-39: CPT | Performed by: FAMILY MEDICINE

## 2020-02-14 PROCEDURE — 36415 COLL VENOUS BLD VENIPUNCTURE: CPT | Performed by: FAMILY MEDICINE

## 2020-02-14 PROCEDURE — 80050 GENERAL HEALTH PANEL: CPT | Performed by: FAMILY MEDICINE

## 2020-02-14 PROCEDURE — 80061 LIPID PANEL: CPT | Performed by: FAMILY MEDICINE

## 2020-02-14 ASSESSMENT — MIFFLIN-ST. JEOR: SCORE: 2032.16

## 2020-02-14 ASSESSMENT — PAIN SCALES - GENERAL: PAINLEVEL: NO PAIN (0)

## 2020-02-14 NOTE — PATIENT INSTRUCTIONS
Return in 1 year for physical, fasting labs     Patient Education   Personalized Prevention Plan  You are due for the preventive services outlined below.  Your care team is available to assist you in scheduling these services.  If you have already completed any of these items, please share that information with your care team to update in your medical record.  Health Maintenance Due   Topic Date Due     Annual Wellness Visit  03/22/2017

## 2020-02-14 NOTE — PROGRESS NOTES
"  SUBJECTIVE:   Sid Zarco is a 34 year old male who presents for Preventive Visit.  Are you in the first 12 months of your Medicare Part B coverage?  No    Physical Health:    In general, how would you rate your overall physical health? fair    Outside of work, how many days during the week do you exercise? 2-3 days/week    Outside of work, approximately how many minutes a day do you exercise?45-60 minutes    If you drink alcohol do you typically have >3 drinks per day or >7 drinks per week? No    Do you usually eat at least 4 servings of fruit and vegetables a day, include whole grains & fiber and avoid regularly eating high fat or \"junk\" foods? NO    Do you have any problems taking medications regularly?  No    Do you have any side effects from medications? none    Needs assistance for the following daily activities: no assistance needed    Which of the following safety concerns are present in your home?  none identified     Hearing impairment: Yes, ears gets plugged easily    In the past 6 months, have you been bothered by leaking of urine? no    Mental Health:    In general, how would you rate your overall mental or emotional health? fair  PHQ-2 Score:      Do you feel safe in your environment? Yes    Have you ever done Advance Care Planning? (For example, a Health Directive, POLST, or a discussion with a medical provider or your loved ones about your wishes): No, advance care planning information given to patient to review.  Patient declined advance care planning discussion at this time.    Additional concerns to address?  no    Anxiety Follow-Up    How are you doing with your anxiety since your last visit? No change    Are you having other symptoms that might be associated with anxiety? No    Have you had a significant life event? No     Are you feeling depressed? No    Do you have any concerns with your use of alcohol or other drugs? No    Social History     Tobacco Use     Smoking status: Former Smoker     " Packs/day: 1.50     Types: Cigarettes     Last attempt to quit: 2006     Years since quittin.1     Smokeless tobacco: Never Used     Tobacco comment: <1 ppd for most of the time (5-6 years)   Substance Use Topics     Alcohol use: Yes     Comment: socially, on holidays     Drug use: No     JODI-7 SCORE 2014   Total Score - 4   Total Score BEH Adult 9 -     PHQ 2019   PHQ-9 Total Score 6   Q9: Thoughts of better off dead/self-harm past 2 weeks Not at all     Last PHQ-9 2019   1.  Little interest or pleasure in doing things 1   2.  Feeling down, depressed, or hopeless 1   3.  Trouble falling or staying asleep, or sleeping too much 2   4.  Feeling tired or having little energy 1   5.  Poor appetite or overeating 1   6.  Feeling bad about yourself 0   7.  Trouble concentrating 0   8.  Moving slowly or restless 0   Q9: Thoughts of better off dead/self-harm past 2 weeks 0   PHQ-9 Total Score 6   Difficulty at work, home, or with people Somewhat difficult     JODI-7  2019   1. Feeling nervous, anxious, or on edge 1   2. Not being able to stop or control worrying 1   3. Worrying too much about different things 0   4. Trouble relaxing 0   5. Being so restless that it is hard to sit still 0   6. Becoming easily annoyed or irritable 2   7. Feeling afraid, as if something awful might happen 0   JODI-7 Total Score 4       Fall risk:  Fallen 2 or more times in the past year?: No  Any fall with injury in the past year?: No    Cognitive Screenin) Repeat 3 items (Leader, Season, Table)    2) Clock draw: NORMAL  3) 3 item recall: Recalls 3 objects  Results: 3 items recalled: COGNITIVE IMPAIRMENT LESS LIKELY    Mini-CogTM Copyright S [employer]. Licensed by the author for use in [facility]; reprinted with permission (genevieve@.[email or web address]). All rights reserved.      Do you have sleep apnea, excessive snoring or daytime drowsiness?: dx with hyponia          Reviewed and updated as needed this visit  by clinical staff  Tobacco  Allergies  Meds  Med Hx  Surg Hx  Fam Hx  Soc Hx        Reviewed and updated as needed this visit by Provider        Social History     Tobacco Use     Smoking status: Former Smoker     Packs/day: 1.50     Types: Cigarettes     Last attempt to quit: 2006     Years since quittin.1     Smokeless tobacco: Never Used     Tobacco comment: <1 ppd for most of the time (5-6 years)   Substance Use Topics     Alcohol use: Yes     Comment: socially, on holidays                           Current providers sharing in care for this patient include:   Patient Care Team:  Erik Newberry MD as PCP - General (Family Practice)    The following health maintenance items are reviewed in Epic and correct as of today:  Health Maintenance   Topic Date Due     MEDICARE ANNUAL WELLNESS VISIT  2017     LIPID  2017     ASTHMA CONTROL TEST  2020     ASTHMA ACTION PLAN  2021     DTAP/TDAP/TD IMMUNIZATION (2 - Td) 2021     HIV SCREENING  Completed     PHQ-2  Completed     INFLUENZA VACCINE  Completed     IPV IMMUNIZATION  Aged Out     MENINGITIS IMMUNIZATION  Aged Out     Lab work is in process  Labs reviewed in EPIC  BP Readings from Last 3 Encounters:   20 133/64   20 127/80   10/18/19 128/82    Wt Readings from Last 3 Encounters:   20 105.4 kg (232 lb 6.4 oz)   20 105.1 kg (231 lb 9.6 oz)   10/18/19 104.3 kg (230 lb)                  Patient Active Problem List   Diagnosis     Panic disorder     Generalized anxiety disorder     CARDIOVASCULAR SCREENING; LDL GOAL LESS THAN 160     Chronic mental illness     Schizophrenic episode, acute, chronic condition (H)     Schizo-affective psychosis (H)     Obesity (BMI 30-39.9)     Schizoaffective disorder, unspecified (H)     Mixed hyperlipidemia     Chronic insomnia     History of epilepsy     Mild intermittent asthma without complication     Circadian rhythm sleep disorder, delayed sleep phase type      Past Surgical History:   Procedure Laterality Date     NO HISTORY OF SURGERY       none         Social History     Tobacco Use     Smoking status: Former Smoker     Packs/day: 1.50     Types: Cigarettes     Last attempt to quit:      Years since quittin.1     Smokeless tobacco: Never Used     Tobacco comment: <1 ppd for most of the time (5-6 years)   Substance Use Topics     Alcohol use: Yes     Comment: socially, on holidays     Family History   Problem Relation Age of Onset     Schizophrenia Mother      Diabetes Mother      Bipolar Disorder Father      Sleep Apnea Father      Anxiety Disorder Brother      Schizophrenia Brother      Depression Brother      Bipolar Disorder Brother      Sleep Apnea Brother      Schizophrenia Other      Diabetes Maternal Grandfather      Diabetes Paternal Grandmother          Current Outpatient Medications   Medication Sig Dispense Refill     albuterol (PROAIR HFA/PROVENTIL HFA/VENTOLIN HFA) 108 (90 Base) MCG/ACT inhaler Inhale 2 puffs into the lungs every 6 hours as needed for shortness of breath / dyspnea Okay to dispense any form of albuterol inhaler that is covered by insurance. 1 Inhaler 1     ALPRAZolam (XANAX) 1 MG tablet as needed   3     eszopiclone (LUNESTA) 3 MG tablet TK 1 T PO  HS PRN  3     ROZEREM 8 MG tablet TK 1/2 T PO HS  1     SILENOR 6 MG tablet TK ONE T PO HS  2     lamoTRIgine (LAMICTAL) 25 MG tablet 100 mg   1     Allergies   Allergen Reactions     Dust Mites      Mildew      Recent Labs   Lab Test 20  1048 16  1050 16  1126 09/16/15  1512 03/12/15  1323 01/13/15 11/16/14  0739   * 179* 155*  --   --   --   --    HDL 51 51 42  --   --   --   --    TRIG 290* 197* 198*  --   --   --   --    ALT 49  --   --  89* 35 31 66   CR 1.02  --   --   --   --  0.97 1.05   GFRESTIMATED >90  --   --   --   --   --  83   GFRESTBLACK >90  --   --   --   --   --  >90  African American GFR Calc     POTASSIUM 4.3  --   --   --   --  5.1 3.8   TSH  1.26  --  1.33  --   --   --   --           ROS:  CONSTITUTIONAL: NEGATIVE for fever, chills, change in weight  INTEGUMENTARY/SKIN: NEGATIVE for worrisome rashes, moles or lesions  EYES: NEGATIVE for vision changes or irritation  ENT/MOUTH: NEGATIVE for ear, mouth and throat problems  RESP: NEGATIVE for significant cough or SOB  RESP: History of asthma  CV: NEGATIVE for chest pain, palpitations or peripheral edema  GI: NEGATIVE for nausea, abdominal pain, heartburn, or change in bowel habits  : negative for dysuria, hematuria, decreased urinary stream, erectile dysfunction  MUSCULOSKELETAL: NEGATIVE for significant arthralgias or myalgia  NEURO: NEGATIVE for weakness, dizziness or paresthesias  ENDOCRINE: NEGATIVE for temperature intolerance, skin/hair changes  HEME/ALLERGY/IMMUNE: NEGATIVE for bleeding problems  PSYCHIATRIC: Schizoaffective disorder    OBJECTIVE:   /64 (BP Location: Right arm, Patient Position: Sitting, Cuff Size: Adult Large)   Pulse 82   Temp 98.4  F (36.9  C) (Oral)   Ht 1.829 m (6')   Wt 105.4 kg (232 lb 6.4 oz)   SpO2 97%   BMI 31.52 kg/m   Estimated body mass index is 31.52 kg/m  as calculated from the following:    Height as of this encounter: 1.829 m (6').    Weight as of this encounter: 105.4 kg (232 lb 6.4 oz).  EXAM:   GENERAL: healthy, alert and no distress  EYES: Eyes grossly normal to inspection, PERRL and conjunctivae and sclerae normal  HENT: ear canals and TM's normal, nose and mouth without ulcers or lesions  NECK: no adenopathy, no asymmetry, masses, or scars and thyroid normal to palpation  RESP: lungs clear to auscultation - no rales, rhonchi or wheezes  CV: regular rate and rhythm, normal S1 S2, no S3 or S4, no murmur, click or rub, no peripheral edema and peripheral pulses strong  ABDOMEN: soft, nontender, no hepatosplenomegaly, no masses and bowel sounds normal  MS: no gross musculoskeletal defects noted, no edema  SKIN: no suspicious lesions or rashes  NEURO:  Normal strength and tone, mentation intact and speech normal  PSYCH: mentation appears normal, affect normal/bright    Diagnostic Test Results:  Labs reviewed in Epic  Results for orders placed or performed in visit on 02/14/20 (from the past 24 hour(s))   CBC with platelets differential   Result Value Ref Range    WBC 6.0 4.0 - 11.0 10e9/L    RBC Count 5.87 4.4 - 5.9 10e12/L    Hemoglobin 15.1 13.3 - 17.7 g/dL    Hematocrit 46.7 40.0 - 53.0 %    MCV 80 78 - 100 fl    MCH 25.7 (L) 26.5 - 33.0 pg    MCHC 32.3 31.5 - 36.5 g/dL    RDW 12.9 10.0 - 15.0 %    Platelet Count 265 150 - 450 10e9/L    Diff Method Automated Method     % Neutrophils 53.8 %    % Lymphocytes 34.6 %    % Monocytes 6.8 %    % Eosinophils 3.7 %    % Basophils 0.8 %    % Immature Granulocytes 0.3 %    Absolute Neutrophil 3.2 1.6 - 8.3 10e9/L    Absolute Lymphocytes 2.1 0.8 - 5.3 10e9/L    Absolute Monocytes 0.4 0.0 - 1.3 10e9/L    Absolute Eosinophils 0.2 0.0 - 0.7 10e9/L    Absolute Basophils 0.1 0.0 - 0.2 10e9/L    Abs Immature Granulocytes 0.0 0 - 0.4 10e9/L   **Comprehensive metabolic panel FUTURE anytime   Result Value Ref Range    Sodium 137 133 - 144 mmol/L    Potassium 4.3 3.4 - 5.3 mmol/L    Chloride 105 94 - 109 mmol/L    Carbon Dioxide 25 20 - 32 mmol/L    Anion Gap 7 3 - 14 mmol/L    Glucose 95 70 - 99 mg/dL    Urea Nitrogen 12 7 - 30 mg/dL    Creatinine 1.02 0.66 - 1.25 mg/dL    GFR Estimate >90 >60 mL/min/[1.73_m2]    GFR Estimate If Black >90 >60 mL/min/[1.73_m2]    Calcium 9.2 8.5 - 10.1 mg/dL    Bilirubin Total 0.4 0.2 - 1.3 mg/dL    Albumin 4.2 3.4 - 5.0 g/dL    Protein Total 8.1 6.8 - 8.8 g/dL    Alkaline Phosphatase 81 40 - 150 U/L    ALT 49 0 - 70 U/L    AST 27 0 - 45 U/L   Lipid panel reflex to direct LDL Fasting   Result Value Ref Range    Cholesterol 257 (H) <200 mg/dL    Triglycerides 290 (H) <150 mg/dL    HDL Cholesterol 51 >39 mg/dL    LDL Cholesterol Calculated 148 (H) <100 mg/dL    Non HDL Cholesterol 206 (H) <130 mg/dL    **TSH with free T4 reflex FUTURE anytime   Result Value Ref Range    TSH 1.26 0.40 - 4.00 mU/L       ASSESSMENT / PLAN:   1. Encounter for Medicare annual wellness exam  : Discussed on regular exercises, healthy eating, self testicular exams  and routine dental checks.      2. CARDIOVASCULAR SCREENING; LDL GOAL LESS THAN 160  LDL Cholesterol Calculated   Date Value Ref Range Status   02/14/2020 148 (H) <100 mg/dL Final     Comment:     Above desirable:  100-129 mg/dl  Borderline High:  130-159 mg/dL  High:             160-189 mg/dL  Very high:       >189 mg/dl           3. Mild intermittent asthma without complication  Stable, continue to use albuterol inhaler as needed    4. Schizoaffective disorder, unspecified type (H)  Continue with psychiatry follow-up as scheduled    5. Obesity (BMI 30-39.9)  Wt Readings from Last 5 Encounters:   02/14/20 105.4 kg (232 lb 6.4 oz)   01/04/20 105.1 kg (231 lb 9.6 oz)   10/18/19 104.3 kg (230 lb)   08/15/19 105.2 kg (232 lb)   07/26/19 104.8 kg (231 lb)     Emphasized on weight loss, portion control, low calorie and low fat diet, healthy eating, regular exercises.      6. Circadian rhythm sleep disorder, delayed sleep phase type  Patient sees sleep physician for follow-ups    7. Mixed hyperlipidemia  Cholesterol   Date Value Ref Range Status   02/14/2020 257 (H) <200 mg/dL Final     Comment:     Desirable:       <200 mg/dl   11/22/2016 269 (H) <200 mg/dL Final     Comment:     Desirable:       <200 mg/dl     HDL Cholesterol   Date Value Ref Range Status   02/14/2020 51 >39 mg/dL Final   11/22/2016 51 >39 mg/dL Final     LDL Cholesterol Calculated   Date Value Ref Range Status   02/14/2020 148 (H) <100 mg/dL Final     Comment:     Above desirable:  100-129 mg/dl  Borderline High:  130-159 mg/dL  High:             160-189 mg/dL  Very high:       >189 mg/dl     11/22/2016 179 (H) <100 mg/dL Final     Comment:     Above desirable:  100-129 mg/dl   Borderline High:  130-159 mg/dL    High:             160-189 mg/dL   Very high:       >189 mg/dl       Triglycerides   Date Value Ref Range Status   02/14/2020 290 (H) <150 mg/dL Final     Comment:     Borderline high:  150-199 mg/dl  High:             200-499 mg/dl  Very high:       >499 mg/dl  Fasting specimen     11/22/2016 197 (H) <150 mg/dL Final     Comment:     Borderline high:  150-199 mg/dl   High:             200-499 mg/dl   Very high:       >499 mg/dl   Fasting specimen       No results found for: CHOLHDLRATIO  Reviewed moderately elevated cholesterol numbers, emphasized on healthy eating, portion control, regular exercises, avoid high fat, greasy and fatty foods      COUNSELING:  Reviewed preventive health counseling, as reflected in patient instructions  Special attention given to:       Regular exercise       Healthy diet/nutrition       Vision screening       Dental care       Safe sex practices/STD prevention    Estimated body mass index is 31.52 kg/m  as calculated from the following:    Height as of this encounter: 1.829 m (6').    Weight as of this encounter: 105.4 kg (232 lb 6.4 oz).    Weight management plan: Discussed healthy diet and exercise guidelines     reports that he quit smoking about 14 years ago. His smoking use included cigarettes. He smoked 1.50 packs per day. He has never used smokeless tobacco.      Appropriate preventive services were discussed with this patient, including applicable screening as appropriate for cardiovascular disease, diabetes, osteopenia/osteoporosis, and glaucoma.  As appropriate for age/gender, discussed screening for colorectal cancer, prostate cancer, breast cancer, and cervical cancer. Checklist reviewing preventive services available has been given to the patient.    Reviewed patients plan of care and provided an AVS. The Intermediate Care Plan ( asthma action plan, low back pain action plan, and migraine action plan) for Sid meets the Care Plan requirement. This Care Plan has been  established and reviewed with the Patient.    Counseling Resources:  ATP IV Guidelines  Pooled Cohorts Equation Calculator  Breast Cancer Risk Calculator  FRAX Risk Assessment  ICSI Preventive Guidelines  Dietary Guidelines for Americans, 2010  USDA's MyPlate  ASA Prophylaxis  Lung CA Screening    Erik Newberry MD  [facility]  Chart documentation done in part with Dragon Voice recognition Software. Although reviewed after completion, some word and grammatical error may remain.

## 2021-02-23 ENCOUNTER — TRANSFERRED RECORDS (OUTPATIENT)
Dept: HEALTH INFORMATION MANAGEMENT | Facility: CLINIC | Age: 36
End: 2021-02-23

## 2021-04-19 ENCOUNTER — NURSE TRIAGE (OUTPATIENT)
Dept: NURSING | Facility: CLINIC | Age: 36
End: 2021-04-19

## 2021-04-19 NOTE — TELEPHONE ENCOUNTER
Patient reports that he received the [employer] vaccine about a week ago. He is concerned about the possible blood clots because his dad developed a rare blood disorder later in life that was essentially the same thing. Patient has not been diagnosed with anything himself. Patient reports that since getting the vaccine he has had 3 nose bleeds, he does not typically get them. One bleed was larger in amount and took longer to stop, the other two were smaller. Patient also reports that he had a strange bruise on his abdomen that took awhile to go away. He does not remember an injury to his abdomen but is also not ruling it out completely either. Patient denies any difficulty breathing, denies any leg pain or swelling.     Patient is advised that a message will be sent to his provider to review and provide recommendations at this time. Patient verbalizes understanding and denies further questions.    Leena Roe RN  [facility]          Reason for Disposition    Sounds like a severe, unusual reaction to the triager    Additional Information    Negative: [1] Difficulty breathing or swallowing AND [2] starts within 2 hours after injection    Negative: Sounds like a life-threatening emergency to the triager    Negative: [1] COVID-19 exposure AND [2] no symptoms    Negative: [1] Typical COVID-19 symptoms AND [2] symptoms that are NOT expected from vaccine (e.g., cough, difficulty breathing, loss of taste or smell, runny nose, sore throat)    Negative: [1] Typical COVID-19 symptoms AND [2] started > 3 days after getting vaccine    Negative: Fever > 104 F (40 C)    Protocols used: CORONAVIRUS (COVID-19) VACCINE QUESTIONS AND [id number] 1.3    COVID 19 Nurse Triage Plan/Patient Instructions    Please be aware that novel coronavirus (COVID-19) may be circulating in the community. If you develop symptoms such as fever, cough, or SOB or if you have concerns about the presence of another  infection including coronavirus (COVID-19), please contact your health care provider or visit [email or web address].     Disposition/Instructions    ED Visit recommended. Follow protocol based instructions.     Bring Your Own Device:  Please also bring your smart device(s) (smart phones, tablets, laptops) and their charging cables for your personal use and to communicate with your care team during your visit.    Thank you for taking steps to prevent the spread of this virus.  o Limit your contact with others.  o Wear a simple mask to cover your cough.  o Wash your hands well and often.    Resources    [facility]: About COVID-19: [email or web address]    CDC: What to Do If You're Sick: www.cdc.gov/coronavirus/2019-ncov/about/steps-when-sick.html    CDC: Ending Home Isolation: www.cdc.gov/coronavirus/2019-ncov/hcp/disposition-in-home-patients.html     CDC: Caring for Someone: www.cdc.gov/coronavirus/2019-ncov/if-you-are-sick/care-for-someone.html     [facility]: Interim Guidance for Hospital Discharge to Home: www.health.[facility].[place]/diseases/coronavirus/hcp/hospdischarge.pdf    [facility] clinical trials (COVID-19 research studies): [email or web address].[email or web address]-clinical-trials     Below are the COVID-19 hotlines at the [place] Department of Health ([facility]). Interpreters are available.   o For health questions: Call 496-073-6444 or 1-757.729.7073 (7 a.m. to 7 p.m.)  o For questions about schools and childcare: Call 623-540-0651 or 1-452.744.3974 (7 a.m. to 7 p.m.)

## 2021-08-07 ENCOUNTER — HEALTH MAINTENANCE LETTER (OUTPATIENT)
Age: 36
End: 2021-08-07

## 2021-09-13 ENCOUNTER — OFFICE VISIT (OUTPATIENT)
Dept: FAMILY MEDICINE | Facility: CLINIC | Age: 36
End: 2021-09-13
Payer: MEDICARE

## 2021-09-13 VITALS
HEIGHT: 77 IN | OXYGEN SATURATION: 100 % | BODY MASS INDEX: 28.24 KG/M2 | SYSTOLIC BLOOD PRESSURE: 130 MMHG | HEART RATE: 97 BPM | WEIGHT: 239.13 LBS | DIASTOLIC BLOOD PRESSURE: 81 MMHG

## 2021-09-13 DIAGNOSIS — F51.04 CHRONIC INSOMNIA: ICD-10-CM

## 2021-09-13 DIAGNOSIS — Z11.59 NEED FOR HEPATITIS C SCREENING TEST: ICD-10-CM

## 2021-09-13 DIAGNOSIS — R03.0 ELEVATED BP WITHOUT DIAGNOSIS OF HYPERTENSION: ICD-10-CM

## 2021-09-13 DIAGNOSIS — Z00.00 ENCOUNTER FOR MEDICARE ANNUAL WELLNESS EXAM: Primary | ICD-10-CM

## 2021-09-13 DIAGNOSIS — J45.20 MILD INTERMITTENT ASTHMA WITHOUT COMPLICATION: ICD-10-CM

## 2021-09-13 DIAGNOSIS — F25.9 SCHIZOAFFECTIVE DISORDER, UNSPECIFIED TYPE (H): ICD-10-CM

## 2021-09-13 DIAGNOSIS — E78.2 MIXED HYPERLIPIDEMIA: ICD-10-CM

## 2021-09-13 DIAGNOSIS — Z13.1 SCREENING FOR DIABETES MELLITUS (DM): ICD-10-CM

## 2021-09-13 PROCEDURE — 99395 PREV VISIT EST AGE 18-39: CPT | Performed by: FAMILY MEDICINE

## 2021-09-13 RX ORDER — ESZOPICLONE 1 MG/1
TABLET, FILM COATED ORAL
COMMUNITY
Start: 2021-07-01 | End: 2022-09-16

## 2021-09-13 RX ORDER — ALBUTEROL SULFATE 90 UG/1
2 AEROSOL, METERED RESPIRATORY (INHALATION) EVERY 6 HOURS PRN
Qty: 18 G | Refills: 1 | Status: SHIPPED | OUTPATIENT
Start: 2021-09-13 | End: 2021-09-15

## 2021-09-13 RX ORDER — DOXEPIN HYDROCHLORIDE 10 MG/1
CAPSULE ORAL
COMMUNITY
Start: 2020-11-10 | End: 2022-04-05

## 2021-09-13 ASSESSMENT — MIFFLIN-ST. JEOR: SCORE: 2132.04

## 2021-09-13 NOTE — PROGRESS NOTES
SUBJECTIVE:   Sid Zarco is a 36 year old male who presents for Preventive Visit.      Patient has been advised of split billing requirements and indicates understanding: Yes   Are you in the first 12 months of your Medicare coverage?  No    History of Present Illness       He eats 2-3 servings of fruits and vegetables daily.He consumes 0 sweetened beverage(s) daily.He exercises with enough effort to increase his heart rate 10 to 19 minutes per day.  He exercises with enough effort to increase his heart rate 3 or less days per week.   He is taking medications regularly.    Do you feel safe in your environment? No    Have you ever done Advance Care Planning? (For example, a Health Directive, POLST, or a discussion with a medical provider or your loved ones about your wishes): No, advance care planning information given to patient to review.  Patient declined advance care planning discussion at this time.      Cognitive Screening Not appropriate due to mental handicap    Do you have sleep apnea, excessive snoring or daytime drowsiness?: yes    Reviewed and updated as needed this visit by clinical staff  Tobacco  Allergies  Meds              Reviewed and updated as needed this visit by Provider                Social History     Tobacco Use     Smoking status: Former Smoker     Packs/day: 1.50     Types: Cigarettes     Quit date: 2006     Years since quitting: 15.7     Smokeless tobacco: Never Used     Tobacco comment: <1 ppd for most of the time (5-6 years)   Substance Use Topics     Alcohol use: Yes     Comment: socially, on holidays         Alcohol Use 3/22/2016   Prescreen: >3 drinks/day or >7 drinks/week? The patient does not drink >3 drinks per day nor >7 drinks per week.           Asthma Follow-Up    Was ACT completed today?  No      Do you have a cough?  No    Are you experiencing any wheezing in your chest?  No    Do you have any shortness of breath?  No     How often are you using a short-acting (rescue)  inhaler or nebulizer, such as Albuterol?  A few times a month    How many days per week do you miss taking your asthma controller medication?  I do not have an asthma controller medication    Please describe any recent triggers for your asthma: upper respiratory infections    Have you had any Emergency Room Visits, Urgent Care Visits, or Hospital Admissions since your last office visit?  No      Current providers sharing in care for this patient include:   Patient Care Team:  Erik Newberry MD as PCP - General (Family Practice)  Erik Newberry MD as Assigned PCP    The following health maintenance items are reviewed in Epic and correct as of today:  Health Maintenance Due   Topic Date Due     ANNUAL REVIEW OF HM ORDERS  Never done     Pneumococcal Vaccine: Pediatrics (0 to 5 Years) and At-Risk Patients (6 to 64 Years) (1 of 2 - PPSV23) Never done     HEPATITIS C SCREENING  Never done     ASTHMA CONTROL TEST  07/04/2020     LIPID  02/14/2021     INFLUENZA VACCINE (1) 09/01/2021     Lab work is in process  Labs reviewed in EPIC  BP Readings from Last 3 Encounters:   09/13/21 130/81   02/14/20 133/64   01/04/20 127/80    Wt Readings from Last 3 Encounters:   09/13/21 108.5 kg (239 lb 2 oz)   02/14/20 105.4 kg (232 lb 6.4 oz)   01/04/20 105.1 kg (231 lb 9.6 oz)                  Patient Active Problem List   Diagnosis     Panic disorder     Generalized anxiety disorder     CARDIOVASCULAR SCREENING; LDL GOAL LESS THAN 160     Chronic mental illness     Schizophrenic episode, acute, chronic condition (H)     Schizo-affective psychosis (H)     Obesity (BMI 30-39.9)     Schizoaffective disorder, unspecified type (H)     Mixed hyperlipidemia     Chronic insomnia     History of epilepsy     Mild intermittent asthma without complication     Circadian rhythm sleep disorder, delayed sleep phase type     Past Surgical History:   Procedure Laterality Date     NO HISTORY OF SURGERY       none         Social History      Tobacco Use     Smoking status: Former Smoker     Packs/day: 1.50     Types: Cigarettes     Quit date: 2006     Years since quitting: 15.7     Smokeless tobacco: Never Used     Tobacco comment: <1 ppd for most of the time (5-6 years)   Substance Use Topics     Alcohol use: Yes     Comment: socially, on holidays     Family History   Problem Relation Age of Onset     Schizophrenia Mother      Diabetes Mother      Bipolar Disorder Father      Sleep Apnea Father      Anxiety Disorder Brother      Schizophrenia Brother      Depression Brother      Bipolar Disorder Brother      Sleep Apnea Brother      Schizophrenia Other      Diabetes Maternal Grandfather      Diabetes Paternal Grandmother          Current Outpatient Medications   Medication Sig Dispense Refill     albuterol (PROAIR HFA/PROVENTIL HFA/VENTOLIN HFA) 108 (90 Base) MCG/ACT inhaler Inhale 2 puffs into the lungs every 6 hours as needed for shortness of breath / dyspnea Okay to dispense any form of albuterol inhaler that is covered by insurance. 18 g 1     ALPRAZolam (XANAX) 1 MG tablet as needed   3     doxepin (SINEQUAN) 10 MG capsule        eszopiclone (LUNESTA) 1 MG tablet        ROZEREM 8 MG tablet TK 1/2 T PO HS  1     SILENOR 6 MG tablet TK ONE T PO HS  2     Allergies   Allergen Reactions     Dust Mites      Mildew      Recent Labs   Lab Test 02/14/20  1048 11/22/16  1050 03/22/16  1126 09/16/15  1512 03/12/15  1323 01/13/15  0000 11/16/14  0739   * 179* 155*  --   --   --   --    HDL 51 51 42  --   --   --   --    TRIG 290* 197* 198*  --   --   --   --    ALT 49  --   --  89* 35 31 66   CR 1.02  --   --   --   --  0.97 1.05   GFRESTIMATED >90  --   --   --   --   --  83   GFRESTBLACK >90  --   --   --   --   --  >90  African American GFR Calc     POTASSIUM 4.3  --   --   --   --  5.1 3.8   TSH 1.26  --  1.33  --   --   --   --               Review of Systems  CONSTITUTIONAL: NEGATIVE for fever, chills, change in weight  INTEGUMENTARY/SKIN:  "NEGATIVE for worrisome rashes, moles or lesions  EYES: NEGATIVE for vision changes or irritation  ENT/MOUTH: NEGATIVE for ear, mouth and throat problems  RESP: NEGATIVE for significant cough or SOB  RESP:Hx asthma  CV: NEGATIVE for chest pain, palpitations or peripheral edema  GI: NEGATIVE for nausea, abdominal pain, heartburn, or change in bowel habits  : negative for dysuria, hematuria, decreased urinary stream, erectile dysfunction  MUSCULOSKELETAL: NEGATIVE for significant arthralgias or myalgia  NEURO: NEGATIVE for weakness, dizziness or paresthesias  ENDOCRINE: NEGATIVE for temperature intolerance, skin/hair changes  HEME/ALLERGY/IMMUNE: NEGATIVE for bleeding problems  PSYCHIATRIC: NEGATIVE for changes in mood or affect and h/o schizophrenia    OBJECTIVE:   /81   Pulse 97   Ht 1.956 m (6' 5\")   Wt 108.5 kg (239 lb 2 oz)   SpO2 100%   BMI 28.36 kg/m   Estimated body mass index is 28.36 kg/m  as calculated from the following:    Height as of this encounter: 1.956 m (6' 5\").    Weight as of this encounter: 108.5 kg (239 lb 2 oz).  Physical Exam  GENERAL: healthy, alert and no distress  EYES: Eyes grossly normal to inspection, PERRL and conjunctivae and sclerae normal  HENT: ear canals and TM's normal, nose and mouth without ulcers or lesions  NECK: no adenopathy, no asymmetry, masses, or scars and thyroid normal to palpation  RESP: lungs clear to auscultation - no rales, rhonchi or wheezes  CV: regular rate and rhythm, normal S1 S2, no S3 or S4, no murmur, click or rub, no peripheral edema and peripheral pulses strong  ABDOMEN: soft, nontender, no hepatosplenomegaly, no masses and bowel sounds normal  MS: no gross musculoskeletal defects noted, no edema  SKIN: no suspicious lesions or rashes  NEURO: Normal strength and tone, mentation intact and speech normal  PSYCH: mentation appears normal, affect normal/bright    Diagnostic Test Results:  Labs reviewed in Epic    ASSESSMENT / PLAN:   (Z00.00) " "Encounter for Medicare annual wellness exam  (primary encounter diagnosis)  Comment:   Plan: : Discussed on regular exercises, healthy eating, and routine dental checks.    (J45.20) Mild intermittent asthma without complication  Comment:   Plan: albuterol (PROAIR HFA/PROVENTIL HFA/VENTOLIN         HFA) 108 (90 Base) MCG/ACT inhaler, Asthma         Action Plan (AAP)            (R03.0) Elevated BP without diagnosis of hypertension  Comment:   Plan:   BP Readings from Last 6 Encounters:   09/13/21 130/81   02/14/20 133/64   01/04/20 127/80   10/18/19 128/82   08/15/19 119/76   07/26/19 125/86     Will follow low salt diet, weight loss and regular exercises.      (F25.9) Schizoaffective disorder, unspecified type (H)  Comment: Dr. Nathan Pham at [facility]  Plan: continue with current psychiatric care    (F51.04) Chronic insomnia  Comment:   Plan: .as above      (E78.2) Mixed hyperlipidemia  Comment:   Plan: Lipid panel reflex to direct LDL Fasting            (Z13.1) Screening for diabetes mellitus (DM)  Comment:   Plan: Glucose            (Z11.59) Need for hepatitis C screening test  Comment:   Plan: Hepatitis C antibody              Patient has been advised of split billing requirements and indicates understanding: Yes  COUNSELING:  Reviewed preventive health counseling, as reflected in patient instructions  Special attention given to:       Regular exercise       Healthy diet/nutrition       Hearing screening       Dental care       Hepatitis C screening    Estimated body mass index is 28.36 kg/m  as calculated from the following:    Height as of this encounter: 1.956 m (6' 5\").    Weight as of this encounter: 108.5 kg (239 lb 2 oz).    Weight management plan: Discussed healthy diet and exercise guidelines    He reports that he quit smoking about 15 years ago. His smoking use included cigarettes. He smoked 1.50 packs per day. He has never used smokeless tobacco.      Appropriate preventive services were " discussed with this patient, including applicable screening as appropriate for cardiovascular disease, diabetes, osteopenia/osteoporosis, and glaucoma.  As appropriate for age/gender, discussed screening for colorectal cancer, prostate cancer, breast cancer, and cervical cancer. Checklist reviewing preventive services available has been given to the patient.    Reviewed patients plan of care and provided an AVS. The Intermediate Care Plan ( asthma action plan, low back pain action plan, and migraine action plan) for Sid meets the Care Plan requirement. This Care Plan has been established and reviewed with the Patient.    Counseling Resources:  ATP IV Guidelines  Pooled Cohorts Equation Calculator  Breast Cancer Risk Calculator  Breast Cancer: Medication to Reduce Risk  FRAX Risk Assessment  ICSI Preventive Guidelines  Dietary Guidelines for Americans, 2010  USDA's MyPlate  ASA Prophylaxis  Lung CA Screening    Erik Newberry MD  [facility]    Chart documentation done in part with Dragon Voice recognition Software. Although reviewed after completion, some word and grammatical error may remain.

## 2021-09-13 NOTE — LETTER
My Asthma Action Plan  Name: Sid Zarco   YOB: 1985  Date: 9/13/2021   My doctor: Erik Newberry   My clinic: [facility]      My Control Medicine: Albuterol        Dose:   My Rescue Medicine: Albuterol        Dose:    My Asthma Severity: Intermittent / Exercise Induced  Avoid your asthma triggers: upper respiratory infections and exercise or sports        GREEN ZONE   Good Control    I feel good    No cough or wheeze    Can work, sleep and play without asthma symptoms       Take your asthma control medicine every day.     1. If exercise triggers your asthma, take your rescue medication    15 minutes before exercise or sports, and    During exercise if you have asthma symptoms  2. Spacer to use with inhaler: If you have a spacer, make sure to use it with your inhaler             YELLOW ZONE Getting Worse  I have ANY of these:    I do not feel good    Cough or wheeze    Chest feels tight    Wake up at night   1. Keep taking your Green Zone medications  2. Start taking your rescue medicine:    every 20 minutes for up to 1 hour. Then every 4 hours for 24-48 hours.  3. If you stay in the Yellow Zone for more than 12-24 hours, contact your doctor.  4. If you do not return to the Green Zone in 12-24 hours or you get worse, start taking your oral steroid medicine if prescribed by your provider.           RED ZONE Medical Alert - Get Help  I have ANY of these:    I feel awful    Medicine is not helping    Breathing getting harder    Trouble walking or talking    Nose opens wide to breathe       1. Take your rescue medicine NOW  2. If your provider has prescribed an oral steroid medicine, start taking it NOW  3. Call your doctor NOW  4. If you are still in the Red Zone after 20 minutes and you have not reached your doctor:    Take your rescue medicine again and    Call 911 or go to the emergency room right away    See your regular doctor within 2 weeks of an Emergency Room or  Urgent Care visit for follow-up treatment.        The above medication may be given at school or day care?: N/A (Adult Patient)  Child can carry and use inhaler(s) at school with approval of school nurse?: N/A (Adult Patient)    Electronically signed by: Erik Newberry MD, September 13, 2021    Annual Reminders:  Meet with Asthma Educator,  Flu Shot in the Fall, consider Pneumonia Vaccination for patients with asthma (aged 19 and older).    Pharmacy: [id number] [id number] AT [facility]                    Asthma Triggers  How To Control Things That Make Your Asthma Worse    Triggers are things that make your asthma worse.  Look at the list below to help you find your triggers and what you can do about them.  You can help prevent asthma flare-ups by staying away from your triggers.      Trigger                                                          What you can do   Cigarette Smoke  Tobacco smoke can make asthma worse. Do not allow smoking in your home, car or around you.  Be sure no one smokes at a child s day care or school.  If you smoke, ask your health care provider for ways to help you quit.  Ask family members to quit too.  Ask your health care provider for a referral to Quit Plan to help you quit smoking, or call 4-600-858-PLAN.     Colds, Flu, Bronchitis  These are common triggers of asthma. Wash your hands often.  Don t touch your eyes, nose or mouth.  Get a flu shot every year.     Dust Mites  These are tiny bugs that live in cloth or carpet. They are too small to see. Wash sheets and blankets in hot water every week.   Encase pillows and mattress in dust mite proof covers.  Avoid having carpet if you can. If you have carpet, vacuum weekly.   Use a dust mask and HEPA vacuum.   Pollen and Outdoor Mold  Some people are allergic to trees, grass, or weed pollen, or molds. Try to keep your windows closed.  Limit time out doors when pollen count  is high.   Ask you health care provider about taking medicine during allergy season.     Animal Dander  Some people are allergic to skin flakes, urine or saliva from pets with fur or feathers. Keep pets with fur or feathers out of your home.    If you can t keep the pet outdoors, then keep the pet out of your bedroom.  Keep the bedroom door closed.  Keep pets off cloth furniture and away from stuffed toys.     Mice, Rats, and Cockroaches  Some people are allergic to the waste from these pests.   Cover food and garbage.  Clean up spills and food crumbs.  Store grease in the refrigerator.   Keep food out of the bedroom.   Indoor Mold  This can be a trigger if your home has high moisture. Fix leaking faucets, pipes, or other sources of water.   Clean moldy surfaces.  Dehumidify basement if it is damp and smelly.   Smoke, Strong Odors, and Sprays  These can reduce air quality. Stay away from strong odors and sprays, such as perfume, powder, hair spray, paints, smoke incense, paint, cleaning products, candles and new carpet.   Exercise or Sports  Some people with asthma have this trigger. Be active!  Ask your doctor about taking medicine before sports or exercise to prevent symptoms.    Warm up for 5-10 minutes before and after sports or exercise.     Other Triggers of Asthma  Cold air:  Cover your nose and mouth with a scarf.  Sometimes laughing or crying can be a trigger.  Some medicines and food can trigger asthma.

## 2021-09-13 NOTE — PATIENT INSTRUCTIONS
Patient Education   Personalized Prevention Plan  You are due for the preventive services outlined below.  Your care team is available to assist you in scheduling these services.  If you have already completed any of these items, please share that information with your care team to update in your medical record.  Health Maintenance Due   Topic Date Due     ANNUAL REVIEW OF HM ORDERS  Never done     Pneumococcal Vaccine (1 of 2 - PPSV23) Never done     Hepatitis C Screening  Never done     Asthma Control Test  07/04/2020     Asthma Action Plan - yearly  01/04/2021     Annual Wellness Visit  02/14/2021     Cholesterol Lab  02/14/2021     Flu Vaccine (1) 09/01/2021

## 2021-09-23 ENCOUNTER — LAB (OUTPATIENT)
Dept: LAB | Facility: CLINIC | Age: 36
End: 2021-09-23
Payer: MEDICARE

## 2021-09-23 DIAGNOSIS — Z13.1 SCREENING FOR DIABETES MELLITUS (DM): ICD-10-CM

## 2021-09-23 DIAGNOSIS — Z11.59 NEED FOR HEPATITIS C SCREENING TEST: ICD-10-CM

## 2021-09-23 DIAGNOSIS — E78.2 MIXED HYPERLIPIDEMIA: ICD-10-CM

## 2021-09-23 LAB
CHOLEST SERPL-MCNC: 270 MG/DL
FASTING STATUS PATIENT QL REPORTED: YES
FASTING STATUS PATIENT QL REPORTED: YES
GLUCOSE BLD-MCNC: 97 MG/DL (ref 70–99)
HDLC SERPL-MCNC: 44 MG/DL
LDLC SERPL CALC-MCNC: 179 MG/DL
NONHDLC SERPL-MCNC: 226 MG/DL
TRIGL SERPL-MCNC: 235 MG/DL

## 2021-09-23 PROCEDURE — 86803 HEPATITIS C AB TEST: CPT

## 2021-09-23 PROCEDURE — 80061 LIPID PANEL: CPT

## 2021-09-23 PROCEDURE — 82947 ASSAY GLUCOSE BLOOD QUANT: CPT

## 2021-09-23 PROCEDURE — 36415 COLL VENOUS BLD VENIPUNCTURE: CPT

## 2021-09-24 LAB — HCV AB SERPL QL IA: NONREACTIVE

## 2021-09-24 NOTE — RESULT ENCOUNTER NOTE
Antoni Lau,  Your lab test showed normal results for fasting blood sugar with no concern for diabetes and negative screening test for hepatitis C.  These are good and reassuring.  Your fasting cholesterol numbers are significantly elevated almost like her weight was 4 years ago.  Please continue to work on weight loss, regular exercises and avoid diet high in calories, simple sugars, sweets, saturated fat, fatty, fast and fried foods.  Let me know if you are interested in seeing a dietitian so I can make a referral.   Let me know if you have any questions. Take care.  Erik Newberry MD

## 2021-10-02 ENCOUNTER — HEALTH MAINTENANCE LETTER (OUTPATIENT)
Age: 36
End: 2021-10-02

## 2022-04-04 ENCOUNTER — OFFICE VISIT (OUTPATIENT)
Dept: FAMILY MEDICINE | Facility: CLINIC | Age: 37
End: 2022-04-04
Payer: MEDICARE

## 2022-04-04 VITALS
RESPIRATION RATE: 30 BRPM | TEMPERATURE: 98.1 F | SYSTOLIC BLOOD PRESSURE: 130 MMHG | OXYGEN SATURATION: 97 % | DIASTOLIC BLOOD PRESSURE: 82 MMHG | WEIGHT: 247 LBS | BODY MASS INDEX: 29.29 KG/M2 | HEART RATE: 114 BPM

## 2022-04-04 DIAGNOSIS — Z87.898 HISTORY OF SEIZURE: Primary | ICD-10-CM

## 2022-04-04 PROCEDURE — 99213 OFFICE O/P EST LOW 20 MIN: CPT | Performed by: FAMILY MEDICINE

## 2022-04-04 ASSESSMENT — PAIN SCALES - GENERAL: PAINLEVEL: NO PAIN (0)

## 2022-04-04 NOTE — PROGRESS NOTES
"  Assessment & Plan     History of seizure  DMV forms were filled  Last episode was of seizures per patient-1998               BMI:   Estimated body mass index is 29.29 kg/m  as calculated from the following:    Height as of 9/13/21: 1.956 m (6' 5\").    Weight as of this encounter: 112 kg (247 lb).       Chart documentation done in part with Dragon Voice recognition Software. Although reviewed after completion, some word and grammatical error may remain.    See Patient Instructions    Return in about 2 months (around 6/4/2022), or if symptoms worsen or fail to improve, for fasting labs, follow up.    Erik Newberry MD  [facility] [place]    Hema Lau is a 37 year old who presents for the following health issues   Patient with past medical history significant for schizoaffective disorder, chronic insomnia, mild intermittent asthma history of epilepsy at the age of 9 that started between [id number] with a last episode which he believes was in [id number] with a reported loss of consciousness  Patient has brought the form from [facility] that  needs to be filled out  Patient reported having negative EEG and no history of epilepsy with no more episodes of seizure activity in more than 20 years now  He sees Dr. Pham at [facility] for his mental health medications and follow-up      History of Present Illness       Reason for visit:  I need a request for loss of consciousness document filled out stating that I'm fit to drive.  Symptom onset:  More than a month  Symptoms include:  Lack of seizures. I had epilepsy as a child, which has been in remission for over 2 decades.  Symptom intensity:  Mild  Symptom progression:  Staying the same  Had these symptoms before:  Yes  Has tried/received treatment for these symptoms:  Yes  Previous treatment was successful:  Yes  Prior treatment description:  I was placed on anticonvulsants for several years which helped lessen the number of seizures I " experienced.  What makes it worse:  No.  What makes it better:  No.    He eats 0-1 servings of fruits and vegetables daily.He consumes 0 sweetened beverage(s) daily.He exercises with enough effort to increase his heart rate 10 to 19 minutes per day.  He exercises with enough effort to increase his heart rate 3 or less days per week.   He is taking medications regularly.             Review of Systems   CONSTITUTIONAL: NEGATIVE for fever, chills, change in weight  RESP: NEGATIVE for significant cough or SOB  CV: NEGATIVE for chest pain, palpitations or peripheral edema  NEURO: NEGATIVE for weakness, dizziness or paresthesias and as above  PSYCHIATRIC: History of schizoaffective disorder      Objective    /82   Pulse 114   Temp 98.1  F (36.7  C) (Tympanic)   Resp 30   Wt 112 kg (247 lb)   SpO2 97%   BMI 29.29 kg/m    Body mass index is 29.29 kg/m .  Physical Exam   GENERAL: healthy, alert and no distress  RESP: lungs clear to auscultation - no rales, rhonchi or wheezes  CV: regular rate and rhythm, normal S1 S2, no S3 or S4, no murmur, click or rub, no peripheral edema and peripheral pulses strong  NEURO: Normal strength and tone, mentation intact and speech normal  PSYCH: mentation appears normal and anxious

## 2022-06-08 ENCOUNTER — LAB (OUTPATIENT)
Dept: LAB | Facility: CLINIC | Age: 37
End: 2022-06-08
Payer: MEDICARE

## 2022-06-08 DIAGNOSIS — F41.1 GENERALIZED ANXIETY DISORDER: ICD-10-CM

## 2022-06-08 LAB
FOLATE SERPL-MCNC: 11.1 NG/ML
TSH SERPL DL<=0.005 MIU/L-ACNC: 1.85 MU/L (ref 0.4–4)

## 2022-06-08 PROCEDURE — 82306 VITAMIN D 25 HYDROXY: CPT | Mod: GA

## 2022-06-08 PROCEDURE — 82746 ASSAY OF FOLIC ACID SERUM: CPT

## 2022-06-08 PROCEDURE — 36415 COLL VENOUS BLD VENIPUNCTURE: CPT

## 2022-06-08 PROCEDURE — 80061 LIPID PANEL: CPT | Performed by: FAMILY MEDICINE

## 2022-06-08 PROCEDURE — 84443 ASSAY THYROID STIM HORMONE: CPT

## 2022-06-13 ENCOUNTER — OFFICE VISIT (OUTPATIENT)
Dept: FAMILY MEDICINE | Facility: CLINIC | Age: 37
End: 2022-06-13
Payer: MEDICARE

## 2022-06-13 VITALS
WEIGHT: 248.2 LBS | RESPIRATION RATE: 22 BRPM | BODY MASS INDEX: 29.43 KG/M2 | SYSTOLIC BLOOD PRESSURE: 130 MMHG | DIASTOLIC BLOOD PRESSURE: 86 MMHG | HEART RATE: 90 BPM | OXYGEN SATURATION: 95 % | TEMPERATURE: 97.2 F

## 2022-06-13 DIAGNOSIS — J45.20 MILD INTERMITTENT ASTHMA WITHOUT COMPLICATION: ICD-10-CM

## 2022-06-13 DIAGNOSIS — Z23 NEED FOR TDAP VACCINATION: ICD-10-CM

## 2022-06-13 DIAGNOSIS — E78.2 MIXED HYPERLIPIDEMIA: Primary | ICD-10-CM

## 2022-06-13 LAB
CHOLEST SERPL-MCNC: 286 MG/DL
DEPRECATED CALCIDIOL+CALCIFEROL SERPL-MC: <25 UG/L (ref 20–75)
HDLC SERPL-MCNC: 50 MG/DL
LDLC SERPL CALC-MCNC: 163 MG/DL
NONHDLC SERPL-MCNC: 236 MG/DL
TRIGL SERPL-MCNC: 367 MG/DL
VITAMIN D2 SERPL-MCNC: <5 UG/L
VITAMIN D3 SERPL-MCNC: 20 UG/L

## 2022-06-13 PROCEDURE — 90715 TDAP VACCINE 7 YRS/> IM: CPT | Performed by: FAMILY MEDICINE

## 2022-06-13 PROCEDURE — 99213 OFFICE O/P EST LOW 20 MIN: CPT | Mod: 25 | Performed by: FAMILY MEDICINE

## 2022-06-13 PROCEDURE — 90471 IMMUNIZATION ADMIN: CPT | Performed by: FAMILY MEDICINE

## 2022-06-13 RX ORDER — ALBUTEROL SULFATE 90 UG/1
AEROSOL, METERED RESPIRATORY (INHALATION)
Qty: 18 G | Refills: 1 | Status: SHIPPED | OUTPATIENT
Start: 2022-06-13 | End: 2022-09-16

## 2022-06-13 RX ORDER — DOXEPIN HYDROCHLORIDE 50 MG/1
CAPSULE ORAL
COMMUNITY
Start: 2022-05-23 | End: 2022-09-16

## 2022-06-13 ASSESSMENT — PAIN SCALES - GENERAL: PAINLEVEL: NO PAIN (0)

## 2022-06-13 NOTE — PROGRESS NOTES
"  Assessment & Plan     Mixed hyperlipidemia  Patient is currently on doxepin  Reviewed healthy eating, ways to lose weight, starting on regular exercises avoiding simple sugars, high calorie food, starchy foods  Education handouts given for low-fat healthy meals and low-fat diet  We will add the lipid panel to the blood that was drawn last week from his psychiatrist  If lab cannot add it today, will recheck labs at the time of physical in 3 to 4 months  Reviewed intermittent fasting/time restricted feeding  - Lipid panel reflex to direct LDL Fasting    Need for Tdap vaccination    - TDAP VACCINE (Adacel, Boostrix)    Mild intermittent asthma without complication  Stable, continue with current inhalers as needed  - albuterol (PROAIR HFA/PROVENTIL HFA/VENTOLIN HFA) 108 (90 Base) MCG/ACT inhaler; INHALE 2 PUFFS BY MOUTH INTO THE LUNG EVERY 6 HOURS AS NEEDED SHORTNESS OF BREATH, DYSPNEA    Review of the result(s) of each unique test - Thyroid, vitamin D and folate from 6/8/2022 and lipid panel from September 2021         BMI:   Estimated body mass index is 29.43 kg/m  as calculated from the following:    Height as of 9/13/21: 1.956 m (6' 5\").    Weight as of this encounter: 112.6 kg (248 lb 3.2 oz).   Weight management plan: Discussed healthy diet and exercise guidelines    Work on weight loss  Regular exercise  Chart documentation done in part with Dragon Voice recognition Software. Although reviewed after completion, some word and grammatical error may remain.    See Patient Instructions    Return in about 3 months (around 9/13/2022), or if symptoms worsen or fail to improve, for Physical Exam, fasting labs.    Erik Newberry MD  [facility]    Hema Lau is a 37 year old who presents for the following health issues     History of Present Illness       Reason for visit:  Discuss blood panel and get immunization for tetanus.    He eats 2-3 servings of fruits and vegetables daily.He " consumes 1 sweetened beverage(s) daily.He exercises with enough effort to increase his heart rate 60 or more minutes per day.  He exercises with enough effort to increase his heart rate 4 days per week.   He is taking medications regularly.       Hyperlipidemia Follow-Up      Are you regularly taking any medication or supplement to lower your cholesterol?   No    Are you having muscle aches or other side effects that you think could be caused by your cholesterol lowering medication?  No        Review of Systems   CONSTITUTIONAL: NEGATIVE for fever, chills, change in weight  RESP: NEGATIVE for significant cough or SOB  RESP: History of asthma  CV: NEGATIVE for chest pain, palpitations or peripheral edema  GI: NEGATIVE for nausea, abdominal pain, heartburn, or change in bowel habits  MUSCULOSKELETAL: NEGATIVE for significant arthralgias or myalgia  NEURO: NEGATIVE for weakness, dizziness or paresthesias  PSYCHIATRIC: History of schizoaffective disorder, insomnia      Objective    /86   Pulse 90   Temp 97.2  F (36.2  C) (Temporal)   Resp 22   Wt 112.6 kg (248 lb 3.2 oz)   SpO2 95%   BMI 29.43 kg/m    Body mass index is 29.43 kg/m .  Physical Exam   GENERAL: healthy, alert and no distress  RESP: lungs clear to auscultation - no rales, rhonchi or wheezes  CV: regular rate and rhythm, normal S1 S2, no S3 or S4, no murmur, click or rub, no peripheral edema and peripheral pulses strong  PSYCH: mentation appears normal, affect normal/bright

## 2022-06-13 NOTE — NURSING NOTE
Prior to immunization administration, verified patients identity using patient s name and date of birth. Please see Immunization Activity for additional information.     Screening Questionnaire for Adult Immunization    Are you sick today?   No   Do you have allergies to medications, food, a vaccine component or latex?   Yes   Have you ever had a serious reaction after receiving a vaccination?   No   Do you have a long-term health problem with heart, lung, kidney, or metabolic disease (e.g., diabetes), asthma, a blood disorder, no spleen, complement component deficiency, a cochlear implant, or a spinal fluid leak?  Are you on long-term aspirin therapy?   No   Do you have cancer, leukemia, HIV/AIDS, or any other immune system problem?   No   Do you have a parent, brother, or sister with an immune system problem?   No   In the past 3 months, have you taken medications that affect  your immune system, such as prednisone, other steroids, or anticancer drugs; drugs for the treatment of rheumatoid arthritis, Crohn s disease, or psoriasis; or have you had radiation treatments?   No   Have you had a seizure, or a brain or other nervous system problem?   No   During the past year, have you received a transfusion of blood or blood    products, or been given immune (gamma) globulin or antiviral drug?   No   For women: Are you pregnant or is there a chance you could become       pregnant during the next month?   No   Have you received any vaccinations in the past 4 weeks?   No     Immunization questionnaire answers were all negative.        Per orders of Dr. Newberry, injection of TDAP given by Deedee Plascencia. Patient instructed to remain in clinic for 15 minutes afterwards, and to report any adverse reaction to me immediately.       Screening performed by Deedee Plascencia on 6/13/2022 at 4:09 PM.

## 2022-06-14 NOTE — RESULT ENCOUNTER NOTE
Antoni Lau,  Your fasting cholesterol numbers are moderately elevated again along with a slightly worsened triglycerides  Please follow-up with the plan as we discussed yesterday   Desired or goal levels are:  TOTAL CHOLESTEROL: Desirable is less than 200.   HDL (Good Cholesterol): Desirable is greater than 40 (for men) greater than 50 (for women).  LDL (Bad Cholesterol): Desirable is less than 130 (or less than 100 if you have heart disease or diabetes). Borderline 130-160.  TRIGLYCERIDES: Desirable is less than 150.  Borderline is 150-200..    As you may know, an elevated cholesterol is one factor that increases your risk for heart disease and stroke. You can improve your cholesterol by controlling the amount and type of fat you eat and by increasing your daily activity level.  Here are some ways to improve your nutrition:  Eat less fat (especially butter, Crisco and other saturated fats)  Buy lean cuts of meat, reduce your portions of red meat or substitute poultry or fish  Use skim milk and low-fat dairy products  Eat no more than 4 egg yolks per week  Avoid fried or fast foods that are high in fat  Eat more fruits and vegetables  Also consider starting or increasing your aerobic activity. Aerobic activity is the best way to improve HDL (good) cholesterol.    Let me know if you have any questions. Take care.  Erik Newberry MD

## 2022-07-05 ENCOUNTER — MEDICAL CORRESPONDENCE (OUTPATIENT)
Dept: HEALTH INFORMATION MANAGEMENT | Facility: CLINIC | Age: 37
End: 2022-07-05

## 2022-09-15 ASSESSMENT — ENCOUNTER SYMPTOMS
ABDOMINAL PAIN: 0
DIZZINESS: 0
DIARRHEA: 0
FREQUENCY: 0
HEADACHES: 0
SHORTNESS OF BREATH: 0
HEARTBURN: 0
PALPITATIONS: 0
NAUSEA: 0
CHILLS: 0
MYALGIAS: 0
EYE PAIN: 0
NERVOUS/ANXIOUS: 1
SORE THROAT: 0
FEVER: 0
HEMATOCHEZIA: 0
HEMATURIA: 0
DYSURIA: 0
COUGH: 0
PARESTHESIAS: 0
JOINT SWELLING: 0
CONSTIPATION: 0
WEAKNESS: 0
ARTHRALGIAS: 0

## 2022-09-15 ASSESSMENT — ASTHMA QUESTIONNAIRES
QUESTION_3 LAST FOUR WEEKS HOW OFTEN DID YOUR ASTHMA SYMPTOMS (WHEEZING, COUGHING, SHORTNESS OF BREATH, CHEST TIGHTNESS OR PAIN) WAKE YOU UP AT NIGHT OR EARLIER THAN USUAL IN THE MORNING: NOT AT ALL
QUESTION_2 LAST FOUR WEEKS HOW OFTEN HAVE YOU HAD SHORTNESS OF BREATH: NOT AT ALL
QUESTION_1 LAST FOUR WEEKS HOW MUCH OF THE TIME DID YOUR ASTHMA KEEP YOU FROM GETTING AS MUCH DONE AT WORK, SCHOOL OR AT HOME: NONE OF THE TIME
ACT_TOTALSCORE: 25
ACT_TOTALSCORE: 25
QUESTION_4 LAST FOUR WEEKS HOW OFTEN HAVE YOU USED YOUR RESCUE INHALER OR NEBULIZER MEDICATION (SUCH AS ALBUTEROL): NOT AT ALL
QUESTION_5 LAST FOUR WEEKS HOW WOULD YOU RATE YOUR ASTHMA CONTROL: COMPLETELY CONTROLLED

## 2022-09-15 ASSESSMENT — ACTIVITIES OF DAILY LIVING (ADL): CURRENT_FUNCTION: NO ASSISTANCE NEEDED

## 2022-09-16 ENCOUNTER — TELEPHONE (OUTPATIENT)
Dept: FAMILY MEDICINE | Facility: CLINIC | Age: 37
End: 2022-09-16

## 2022-09-16 ENCOUNTER — OFFICE VISIT (OUTPATIENT)
Dept: FAMILY MEDICINE | Facility: CLINIC | Age: 37
End: 2022-09-16
Payer: MEDICARE

## 2022-09-16 VITALS
WEIGHT: 240.6 LBS | DIASTOLIC BLOOD PRESSURE: 89 MMHG | HEIGHT: 72 IN | OXYGEN SATURATION: 98 % | RESPIRATION RATE: 18 BRPM | SYSTOLIC BLOOD PRESSURE: 138 MMHG | HEART RATE: 96 BPM | TEMPERATURE: 99 F | BODY MASS INDEX: 32.59 KG/M2

## 2022-09-16 DIAGNOSIS — Z00.00 ENCOUNTER FOR MEDICARE ANNUAL WELLNESS EXAM: Primary | ICD-10-CM

## 2022-09-16 DIAGNOSIS — F25.9 SCHIZOAFFECTIVE DISORDER, UNSPECIFIED TYPE (H): ICD-10-CM

## 2022-09-16 DIAGNOSIS — Z51.81 THERAPEUTIC DRUG MONITORING: ICD-10-CM

## 2022-09-16 DIAGNOSIS — J45.20 MILD INTERMITTENT ASTHMA WITHOUT COMPLICATION: ICD-10-CM

## 2022-09-16 DIAGNOSIS — Z13.1 SCREENING FOR DIABETES MELLITUS (DM): ICD-10-CM

## 2022-09-16 DIAGNOSIS — E66.9 OBESITY (BMI 30-39.9): ICD-10-CM

## 2022-09-16 DIAGNOSIS — H61.23 EXCESSIVE CERUMEN IN BOTH EAR CANALS: ICD-10-CM

## 2022-09-16 DIAGNOSIS — H93.8X3 SENSATION OF PLUGGED EAR, BILATERAL: ICD-10-CM

## 2022-09-16 DIAGNOSIS — Z13.0 SCREENING FOR DEFICIENCY ANEMIA: ICD-10-CM

## 2022-09-16 DIAGNOSIS — E78.2 MIXED HYPERLIPIDEMIA: ICD-10-CM

## 2022-09-16 LAB
ALBUMIN SERPL-MCNC: 4.3 G/DL (ref 3.4–5)
ALP SERPL-CCNC: 83 U/L (ref 40–150)
ALT SERPL W P-5'-P-CCNC: 69 U/L (ref 0–70)
ANION GAP SERPL CALCULATED.3IONS-SCNC: 7 MMOL/L (ref 3–14)
AST SERPL W P-5'-P-CCNC: 35 U/L (ref 0–45)
BILIRUB SERPL-MCNC: 0.5 MG/DL (ref 0.2–1.3)
BUN SERPL-MCNC: 11 MG/DL (ref 7–30)
CALCIUM SERPL-MCNC: 9.7 MG/DL (ref 8.5–10.1)
CHLORIDE BLD-SCNC: 104 MMOL/L (ref 94–109)
CHOLEST SERPL-MCNC: 270 MG/DL
CO2 SERPL-SCNC: 27 MMOL/L (ref 20–32)
CREAT SERPL-MCNC: 1.11 MG/DL (ref 0.66–1.25)
ERYTHROCYTE [DISTWIDTH] IN BLOOD BY AUTOMATED COUNT: 13.1 % (ref 10–15)
FASTING STATUS PATIENT QL REPORTED: YES
GFR SERPL CREATININE-BSD FRML MDRD: 88 ML/MIN/1.73M2
GLUCOSE BLD-MCNC: 98 MG/DL (ref 70–99)
HCT VFR BLD AUTO: 44.2 % (ref 40–53)
HDLC SERPL-MCNC: 47 MG/DL
HGB BLD-MCNC: 14.2 G/DL (ref 13.3–17.7)
LDLC SERPL CALC-MCNC: 174 MG/DL
MCH RBC QN AUTO: 25.6 PG (ref 26.5–33)
MCHC RBC AUTO-ENTMCNC: 32.1 G/DL (ref 31.5–36.5)
MCV RBC AUTO: 80 FL (ref 78–100)
NONHDLC SERPL-MCNC: 223 MG/DL
PLATELET # BLD AUTO: 232 10E3/UL (ref 150–450)
POTASSIUM BLD-SCNC: 4.4 MMOL/L (ref 3.4–5.3)
PROT SERPL-MCNC: 8.6 G/DL (ref 6.8–8.8)
RBC # BLD AUTO: 5.54 10E6/UL (ref 4.4–5.9)
SODIUM SERPL-SCNC: 138 MMOL/L (ref 133–144)
TRIGL SERPL-MCNC: 246 MG/DL
WBC # BLD AUTO: 5.3 10E3/UL (ref 4–11)

## 2022-09-16 PROCEDURE — G0439 PPPS, SUBSEQ VISIT: HCPCS | Performed by: FAMILY MEDICINE

## 2022-09-16 PROCEDURE — 80061 LIPID PANEL: CPT | Performed by: FAMILY MEDICINE

## 2022-09-16 PROCEDURE — 85027 COMPLETE CBC AUTOMATED: CPT | Performed by: FAMILY MEDICINE

## 2022-09-16 PROCEDURE — 93000 ELECTROCARDIOGRAM COMPLETE: CPT | Mod: 59 | Performed by: FAMILY MEDICINE

## 2022-09-16 PROCEDURE — 36415 COLL VENOUS BLD VENIPUNCTURE: CPT | Performed by: FAMILY MEDICINE

## 2022-09-16 PROCEDURE — 80053 COMPREHEN METABOLIC PANEL: CPT | Performed by: FAMILY MEDICINE

## 2022-09-16 PROCEDURE — 69209 REMOVE IMPACTED EAR WAX UNI: CPT | Mod: 50 | Performed by: FAMILY MEDICINE

## 2022-09-16 PROCEDURE — 99213 OFFICE O/P EST LOW 20 MIN: CPT | Mod: 25 | Performed by: FAMILY MEDICINE

## 2022-09-16 RX ORDER — ESZOPICLONE 3 MG/1
3 TABLET, FILM COATED ORAL AT BEDTIME
COMMUNITY
Start: 2022-09-09

## 2022-09-16 RX ORDER — ACETAMINOPHEN 160 MG
1 TABLET,DISINTEGRATING ORAL DAILY
COMMUNITY
Start: 2022-06-15

## 2022-09-16 RX ORDER — ALBUTEROL SULFATE 90 UG/1
AEROSOL, METERED RESPIRATORY (INHALATION)
Qty: 18 G | Refills: 1 | Status: SHIPPED | OUTPATIENT
Start: 2022-09-16 | End: 2024-07-16

## 2022-09-16 RX ORDER — DOXEPIN HYDROCHLORIDE 10 MG/1
CAPSULE ORAL
COMMUNITY
Start: 2022-09-10 | End: 2024-03-05

## 2022-09-16 RX ORDER — DOXEPIN HYDROCHLORIDE 50 MG/1
CAPSULE ORAL
COMMUNITY
Start: 2020-11-10 | End: 2024-03-05

## 2022-09-16 ASSESSMENT — ENCOUNTER SYMPTOMS
CONSTIPATION: 0
WEAKNESS: 0
HEARTBURN: 0
PALPITATIONS: 0
MYALGIAS: 0
FREQUENCY: 0
HEMATOCHEZIA: 0
CHILLS: 0
SORE THROAT: 0
PARESTHESIAS: 0
NERVOUS/ANXIOUS: 1
JOINT SWELLING: 0
EYE PAIN: 0
NAUSEA: 0
SHORTNESS OF BREATH: 0
DIARRHEA: 0
HEADACHES: 0
COUGH: 0
FEVER: 0
DIZZINESS: 0
ABDOMINAL PAIN: 0
DYSURIA: 0
HEMATURIA: 0
ARTHRALGIAS: 0

## 2022-09-16 ASSESSMENT — PAIN SCALES - GENERAL: PAINLEVEL: NO PAIN (0)

## 2022-09-16 ASSESSMENT — ACTIVITIES OF DAILY LIVING (ADL): CURRENT_FUNCTION: NO ASSISTANCE NEEDED

## 2022-09-16 NOTE — NURSING NOTE
Patient identified using two patient identifiers.  Ear exam showing wax occlusion completed by provider.  Solution: warm water was placed in the bilateral ear(s) via irrigation tool: elephant ear.  Kylee Vázquez CMA

## 2022-09-16 NOTE — TELEPHONE ENCOUNTER
Please fax EKG from today to Dr. Nathan Pham's office at [facility]  Fax [phone]

## 2022-09-16 NOTE — PATIENT INSTRUCTIONS
Patient Education   Personalized Prevention Plan  You are due for the preventive services outlined below.  Your care team is available to assist you in scheduling these services.  If you have already completed any of these items, please share that information with your care team to update in your medical record.  Health Maintenance Due   Topic Date Due     Flu Vaccine (1) 09/01/2022     Annual Wellness Visit  09/13/2022     Asthma Action Plan - yearly  09/13/2022

## 2022-09-16 NOTE — LETTER
"My Asthma Action Plan  Name: Sid Zarco   YOB: 1985  Date: 9/16/2022   My doctor: Erik Newberry   My clinic: [facility]      My Control Medicine: none        Dose:   My Rescue Medicine: Albuterol        Dose: 2 puffs every 4-6 hours as needed   My Asthma Severity: {DEGREE - MILD, MOD, SEV:591523::\"Intermittent / Exercise Induced\"}  Avoid your asthma triggers: {ASTHMA TRIGGERS:061475}        GREEN ZONE   Good Control    I feel good    No cough or wheeze    Can work, sleep and play without asthma symptoms       Take your asthma control medicine every day.     1. If exercise triggers your asthma, take your rescue medication    15 minutes before exercise or sports, and    During exercise if you have asthma symptoms  2. Spacer to use with inhaler: If you have a spacer, make sure to use it with your inhaler             YELLOW ZONE Getting Worse  I have ANY of these:    I do not feel good    Cough or wheeze    Chest feels tight    Wake up at night   1. Keep taking your Green Zone medications  2. Start taking your rescue medicine:    every 20 minutes for up to 1 hour. Then every 4 hours for 24-48 hours.  3. If you stay in the Yellow Zone for more than 12-24 hours, contact your doctor.  4. If you do not return to the Green Zone in 12-24 hours or you get worse, start taking your oral steroid medicine if prescribed by your provider.           RED ZONE Medical Alert - Get Help  I have ANY of these:    I feel awful    Medicine is not helping    Breathing getting harder    Trouble walking or talking    Nose opens wide to breathe       1. Take your rescue medicine NOW  2. If your provider has prescribed an oral steroid medicine, start taking it NOW  3. Call your doctor NOW  4. If you are still in the Red Zone after 20 minutes and you have not reached your doctor:    Take your rescue medicine again and    Call 911 or go to the emergency room right away    See your regular doctor within " "2 weeks of an Emergency Room or Urgent Care visit for follow-up treatment.        The above medication may be given at school or day care?: {YES NO NO ADULT PARENT:699487::\"Yes\"}  Child can carry and use inhaler(s) at school with approval of school nurse?: {YES NO NO ADULT PARENT:368679::\"Yes\"}    Electronically signed by: Erik Newberry MD, September 16, 2022    Annual Reminders:  Meet with Asthma Educator,  Flu Shot in the Fall, consider Pneumonia Vaccination for patients with asthma (aged 19 and older).    Pharmacy: [id number] [place] [address] AT [facility] & [facility]                    Asthma Triggers  How To Control Things That Make Your Asthma Worse    Triggers are things that make your asthma worse.  Look at the list below to help you find your triggers and what you can do about them.  You can help prevent asthma flare-ups by staying away from your triggers.      Trigger                                                          What you can do   Cigarette Smoke  Tobacco smoke can make asthma worse. Do not allow smoking in your home, car or around you.  Be sure no one smokes at a child s day care or school.  If you smoke, ask your health care provider for ways to help you quit.  Ask family members to quit too.  Ask your health care provider for a referral to Quit Plan to help you quit smoking, or call 6-464-976-PLAN.     Colds, Flu, Bronchitis  These are common triggers of asthma. Wash your hands often.  Don t touch your eyes, nose or mouth.  Get a flu shot every year.     Dust Mites  These are tiny bugs that live in cloth or carpet. They are too small to see. Wash sheets and blankets in hot water every week.   Encase pillows and mattress in dust mite proof covers.  Avoid having carpet if you can. If you have carpet, vacuum weekly.   Use a dust mask and HEPA vacuum.   Pollen and Outdoor Mold  Some people are allergic to trees, grass, or weed pollen, or molds. Try " to keep your windows closed.  Limit time out doors when pollen count is high.   Ask you health care provider about taking medicine during allergy season.     Animal Dander  Some people are allergic to skin flakes, urine or saliva from pets with fur or feathers. Keep pets with fur or feathers out of your home.    If you can t keep the pet outdoors, then keep the pet out of your bedroom.  Keep the bedroom door closed.  Keep pets off cloth furniture and away from stuffed toys.     Mice, Rats, and Cockroaches  Some people are allergic to the waste from these pests.   Cover food and garbage.  Clean up spills and food crumbs.  Store grease in the refrigerator.   Keep food out of the bedroom.   Indoor Mold  This can be a trigger if your home has high moisture. Fix leaking faucets, pipes, or other sources of water.   Clean moldy surfaces.  Dehumidify basement if it is damp and smelly.   Smoke, Strong Odors, and Sprays  These can reduce air quality. Stay away from strong odors and sprays, such as perfume, powder, hair spray, paints, smoke incense, paint, cleaning products, candles and new carpet.   Exercise or Sports  Some people with asthma have this trigger. Be active!  Ask your doctor about taking medicine before sports or exercise to prevent symptoms.    Warm up for 5-10 minutes before and after sports or exercise.     Other Triggers of Asthma  Cold air:  Cover your nose and mouth with a scarf.  Sometimes laughing or crying can be a trigger.  Some medicines and food can trigger asthma.

## 2022-09-16 NOTE — PROGRESS NOTES
"SUBJECTIVE:   Sid is a 37 year old who presents for Preventive Visit.  Patient is here for annual physical and with other concerns as mentioned below  Has history of schizo affective disorder, is currently seeing Dr. Nathan Pham at [facility].  On doxepin and Lunesta  Patient is in need of EKG for therapeutic drug monitoring while on his mental health medications denies concerns for chest pain, chest pressure, syncope, palpitations  He is also here with concerns of having plugged sensation in both ears  Has history of impacted ear cerumen in the past  Patient denies using Q-tips      Are you in the first 12 months of your Medicare coverage?  No    Healthy Habits:     In general, how would you rate your overall health?  Fair    Frequency of exercise:  None    Do you usually eat at least 4 servings of fruit and vegetables a day, include whole grains    & fiber and avoid regularly eating high fat or \"junk\" foods?  No    Ability to successfully perform activities of daily living:  No assistance needed    Home Safety:  No safety concerns identified    Hearing Impairment:  Difficulty following a conversation in a noisy restaurant or crowded room, feel that people are mumbling or not speaking clearly and need to ask people to speak up or repeat themselves    In the past 6 months, have you been bothered by leaking of urine?  No    In general, how would you rate your overall mental or emotional health?  Fair      PHQ-2 Total Score: 1    Additional concerns today:  Yes    Do you feel safe in your environment? Yes    Have you ever done Advance Care Planning? (For example, a Health Directive, POLST, or a discussion with a medical provider or your loved ones about your wishes): No, advance care planning information given to patient to review.  Patient plans to discuss their wishes with loved ones or provider.        Hearing Issues- narrow canal- ear feels plugged  Fall risk  Fallen 2 or more times in the past year?: No  Any " fall with injury in the past year?: No  click delete button to remove this line now  Cognitive Screening   1) Repeat 3 items (Leader, Season, Table)    2) Clock draw: NORMAL  3) 3 item recall: Recalls 3 objects  Results: 3 items recalled: COGNITIVE IMPAIRMENT LESS LIKELY    Mini-CogTM Copyright S [employer]. Licensed by the author for use in [facility]; reprinted with permission (genevieve@[facility]). All rights reserved.      Do you have sleep apnea, excessive snoring or daytime drowsiness?: hypopnea in the past    Reviewed and updated as needed this visit by clinical staff   Tobacco  Allergies  Meds                Reviewed and updated as needed this visit by Provider                   Social History     Tobacco Use     Smoking status: Former Smoker     Packs/day: 1.50     Years: 5.00     Pack years: 7.50     Types: Cigarettes     Start date: 10/11/1999     Quit date: 2006     Years since quittin.7     Smokeless tobacco: Never Used     Tobacco comment: <1 ppd for most of the time (5-6 years)   Substance Use Topics     Alcohol use: Yes     Comment: socially, on holidays     If you drink alcohol do you typically have >3 drinks per day or >7 drinks per week? No    No flowsheet data found.        Hyperlipidemia Follow-Up      Are you regularly taking any medication or supplement to lower your cholesterol?   No    Are you having muscle aches or other side effects that you think could be caused by your cholesterol lowering medication?  N/A      Current providers sharing in care for this patient include:   Patient Care Team:  Erik Newberry MD as PCP - General (Family Practice)  Erik Newberry MD as Assigned PCP    The following health maintenance items are reviewed in Epic and correct as of today:  Health Maintenance   Topic Date Due     INFLUENZA VACCINE (1) 2022     ASTHMA ACTION PLAN  2022     ASTHMA CONTROL TEST  2023     ANNUAL REVIEW OF HM ORDERS  2023     LIPID   2023     MEDICARE ANNUAL WELLNESS VISIT  2023     ADVANCE CARE PLANNING  2027     DTAP/TDAP/TD IMMUNIZATION (3 - Td or Tdap) 2032     HEPATITIS C SCREENING  Completed     HIV SCREENING  Completed     PHQ-2 (once per calendar year)  Completed     COVID-19 Vaccine  Completed     Pneumococcal Vaccine: Pediatrics (0 to 5 Years) and At-Risk Patients (6 to 64 Years)  Aged Out     IPV IMMUNIZATION  Aged Out     MENINGITIS IMMUNIZATION  Aged Out     HEPATITIS B IMMUNIZATION  Aged Out     Lab work is in process  Labs reviewed in EPIC  BP Readings from Last 3 Encounters:   22 138/89   22 130/86   22 130/82    Wt Readings from Last 3 Encounters:   22 109.1 kg (240 lb 9.6 oz)   22 112.6 kg (248 lb 3.2 oz)   22 112 kg (247 lb)                  Patient Active Problem List   Diagnosis     Panic disorder     Generalized anxiety disorder     CARDIOVASCULAR SCREENING; LDL GOAL LESS THAN 160     Chronic mental illness     Schizophrenic episode, acute, chronic condition (H)     Schizo-affective psychosis (H)     Obesity (BMI 30-39.9)     Schizoaffective disorder, unspecified type (H)     Mixed hyperlipidemia     Chronic insomnia     History of epilepsy     Mild intermittent asthma without complication     Circadian rhythm sleep disorder, delayed sleep phase type     History of seizure     Sensation of plugged ear, bilateral     Therapeutic drug monitoring     Past Surgical History:   Procedure Laterality Date     NO HISTORY OF SURGERY       none         Social History     Tobacco Use     Smoking status: Former Smoker     Packs/day: 1.50     Years: 5.00     Pack years: 7.50     Types: Cigarettes     Start date: 10/11/1999     Quit date: 2006     Years since quittin.7     Smokeless tobacco: Never Used     Tobacco comment: <1 ppd for most of the time (5-6 years)   Substance Use Topics     Alcohol use: Yes     Comment: socially, on holidays     Family History   Problem  Relation Age of Onset     Schizophrenia Mother      Diabetes Mother      Depression Mother      Anxiety Disorder Mother      Mental Illness Mother      Substance Abuse Mother      Asthma Mother      Obesity Mother      Bipolar Disorder Father      Sleep Apnea Father      Hypertension Father      Prostate Cancer Father      Other Cancer Father      Mental Illness Father      Genetic Disorder Father      Anxiety Disorder Brother      Schizophrenia Brother      Depression Brother      Bipolar Disorder Brother      Sleep Apnea Brother      Hyperlipidemia Brother      Mental Illness Brother      Substance Abuse Brother      Obesity Brother      Schizophrenia Other      Diabetes Maternal Grandfather      Coronary Artery Disease Maternal Grandfather      Diabetes Paternal Grandmother      Colon Cancer Paternal Grandmother      Other Cancer Paternal Grandmother      Depression Sister      Anxiety Disorder Sister      Mental Illness Sister      Substance Abuse Sister      Asthma Sister      Obesity Sister          Current Outpatient Medications   Medication Sig Dispense Refill     albuterol (PROAIR HFA/PROVENTIL HFA/VENTOLIN HFA) 108 (90 Base) MCG/ACT inhaler INHALE 2 PUFFS BY MOUTH INTO THE LUNG EVERY 6 HOURS AS NEEDED SHORTNESS OF BREATH, DYSPNEA 18 g 1     ALPRAZolam (XANAX) 1 MG tablet as needed   3     carbamide peroxide (DEBROX) 6.5 % otic solution Place 5 drops into both ears daily 15 mL 4     Cholecalciferol (VITAMIN D3) 50 MCG (2000 UT) CAPS Take 1 capsule by mouth daily       doxepin (SINEQUAN) 10 MG capsule TAKE 2 CAPSULES BY MOUTH AS DIRECTED WITH 50MG FOR A TOTAL DAILY DOSE OF 70MG       doxepin (SINEQUAN) 50 MG capsule        eszopiclone (LUNESTA) 3 MG tablet Take 3 mg by mouth At Bedtime       Allergies   Allergen Reactions     Dust Mites      Mildew      Recent Labs   Lab Test 06/08/22  1143 09/23/21  1132 02/14/20  1048 03/22/16  1126 09/16/15  1512 03/12/15  1323 01/13/15  0000 11/16/14  0739   *  179* 148*   < >  --   --   --   --    HDL 50 44 51   < >  --   --   --   --    TRIG 367* 235* 290*   < >  --   --   --   --    ALT  --   --  49  --  89* 35 31 66   CR  --   --  1.02  --   --   --  0.97 1.05   GFRESTIMATED  --   --  >90  --   --   --   --  83   GFRESTBLACK  --   --  >90  --   --   --   --  >90  African American GFR Calc     POTASSIUM  --   --  4.3  --   --   --  5.1 3.8   TSH 1.85  --  1.26   < >  --   --   --   --     < > = values in this interval not displayed.              Review of Systems   Constitutional: Negative for chills and fever.   HENT: Negative for congestion, ear pain, hearing loss and sore throat.    Eyes: Negative for pain and visual disturbance.   Respiratory: Negative for cough and shortness of breath.    Cardiovascular: Negative for chest pain, palpitations and peripheral edema.   Gastrointestinal: Negative for abdominal pain, constipation, diarrhea, heartburn, hematochezia and nausea.   Genitourinary: Negative for dysuria, frequency, genital sores, hematuria, impotence, penile discharge and urgency.   Musculoskeletal: Negative for arthralgias, joint swelling and myalgias.   Skin: Negative for rash.   Neurological: Negative for dizziness, weakness, headaches and paresthesias.   Psychiatric/Behavioral: Negative for mood changes. The patient is nervous/anxious.      CONSTITUTIONAL: NEGATIVE for fever, chills, change in weight  INTEGUMENTARY/SKIN: NEGATIVE for worrisome rashes, moles or lesions  EYES: NEGATIVE for vision changes or irritation  ENT/MOUTH: as above  RESP: NEGATIVE for significant cough or SOB  CV: NEGATIVE for chest pain, palpitations or peripheral edema  GI: NEGATIVE for nausea, abdominal pain, heartburn, or change in bowel habits  : negative for dysuria, hematuria, decreased urinary stream, erectile dysfunction  MUSCULOSKELETAL: NEGATIVE for significant arthralgias or myalgia  NEURO: NEGATIVE for weakness, dizziness or paresthesias  ENDOCRINE: NEGATIVE for  temperature intolerance, skin/hair changes  HEME/ALLERGY/IMMUNE: NEGATIVE for bleeding problems  PSYCHIATRIC: History of schizoaffective disorder    OBJECTIVE:   /89 (BP Location: Right arm, Patient Position: Sitting, Cuff Size: Adult Large)   Pulse 96   Temp 99  F (37.2  C) (Oral)   Resp 18   Ht 1.829 m (6')   Wt 109.1 kg (240 lb 9.6 oz)   SpO2 98%   BMI 32.63 kg/m   Estimated body mass index is 32.63 kg/m  as calculated from the following:    Height as of this encounter: 1.829 m (6').    Weight as of this encounter: 109.1 kg (240 lb 9.6 oz).  Physical Exam  GENERAL: healthy, alert and no distress  EYES: Eyes grossly normal to inspection, PERRL and conjunctivae and sclerae normal  HENT: normal cephalic/atraumatic, both ears: Excess cerumen in both ears, normal tympanic membrane, nose and mouth without ulcers or lesions, oropharynx clear and oral mucous membranes moist  NECK: no adenopathy, no asymmetry, masses, or scars and thyroid normal to palpation  RESP: lungs clear to auscultation - no rales, rhonchi or wheezes  CV: regular rate and rhythm, normal S1 S2, no S3 or S4, no murmur, click or rub, no peripheral edema and peripheral pulses strong  ABDOMEN: soft, nontender, no hepatosplenomegaly, no masses and bowel sounds normal  MS: no gross musculoskeletal defects noted, no edema  SKIN: no suspicious lesions or rashes  NEURO: Normal strength and tone, mentation intact and speech normal  PSYCH: mentation appears normal, affect normal/bright    Diagnostic Test Results:  Labs reviewed in Epic    ASSESSMENT / PLAN:   (Z00.00) Encounter for Medicare annual wellness exam  (primary encounter diagnosis)  Comment:   Plan: : Discussed on regular exercises, healthy eating,   and routine dental checks.    (H61.23) Excessive cerumen in both ear canals  Comment:   Plan: carbamide peroxide (DEBROX) 6.5 % otic         solution, [place] REMOVAL IMPACTED CERUMEN         IRRIGATION/LVG UNILAT          Ear wash was done by CMA  staff on both sides, moderate amount of wax was removed.  Recommended patient to start using Debrox eardrops once daily, follow-up as needed    (H93.8X3) Sensation of plugged ear, bilateral  Comment:   Plan: carbamide peroxide (DEBROX) 6.5 % otic         solution, [place] REMOVAL IMPACTED CERUMEN         IRRIGATION/LVG UNILAT        as above      (F25.9) Schizoaffective disorder, unspecified type (H)  Comment:   Plan: EKG 12-lead complete w/read - Clinics        EKG was done today for therapeutic drug monitoring while on doxepin and Lunesta  Normal EKG reviewed with patient, recommended staff to fax the EKG to Dr. Nathan Pham's office with [facility]    (Z51.81) Therapeutic drug monitoring  Comment:   Plan: EKG 12-lead complete w/read - Clinics        as above        (E78.2) Mixed hyperlipidemia  Comment:   Plan: Lipid panel reflex to direct LDL Fasting            (Z13.0) Screening for deficiency anemia  Comment:   Plan: CBC with platelets            (Z13.1) Screening for diabetes mellitus (DM)  Comment:   Plan: Comprehensive metabolic panel (BMP + Alb, Alk         Phos, ALT, AST, Total. Bili, TP)            (J45.20) Mild intermittent asthma without complication  Comment:   Plan: albuterol (PROAIR HFA/PROVENTIL HFA/VENTOLIN         HFA) 108 (90 Base) MCG/ACT inhaler        Stable, continue current inhalers, recheck in 1 year or sooner if needed        (E66.9) Obesity (BMI 30-39.9)  Comment:   Plan:   Wt Readings from Last 5 Encounters:   09/16/22 109.1 kg (240 lb 9.6 oz)   06/13/22 112.6 kg (248 lb 3.2 oz)   04/04/22 112 kg (247 lb)   09/13/21 108.5 kg (239 lb 2 oz)   02/14/20 105.4 kg (232 lb 6.4 oz)     Patient reported stopping his coffee at [employer] that he was doing 5 times a week  Appreciated patient's efforts on weight loss, continue with portion control, healthy eating, regular exercises, recheck in 1 year or sooner if needed        COUNSELING:  Reviewed preventive health counseling, as reflected in patient  instructions  Special attention given to:       Regular exercise       Healthy diet/nutrition       Vision screening          Estimated body mass index is 32.63 kg/m  as calculated from the following:    Height as of this encounter: 1.829 m (6').    Weight as of this encounter: 109.1 kg (240 lb 9.6 oz).        He reports that he quit smoking about 16 years ago. His smoking use included cigarettes. He started smoking about 22 years ago. He has a 7.50 pack-year smoking history. He has never used smokeless tobacco.      Appropriate preventive services were discussed with this patient, including applicable screening as appropriate for cardiovascular disease, diabetes, osteopenia/osteoporosis, and glaucoma.  As appropriate for age/gender, discussed screening for colorectal cancer, prostate cancer, breast cancer, and cervical cancer. Checklist reviewing preventive services available has been given to the patient.    Reviewed patients plan of care and provided an AVS. The Immediate care plan including managing his weight and lipids for Sdi meets the Care Plan requirement. This Care Plan has been established and reviewed with the Patient.    Counseling Resources:  ATP IV Guidelines  Pooled Cohorts Equation Calculator  Breast Cancer Risk Calculator  Breast Cancer: Medication to Reduce Risk  FRAX Risk Assessment  ICSI Preventive Guidelines  Dietary Guidelines for Americans, 2010  USDA's MyPlate  ASA Prophylaxis  Lung CA Screening    Erik Newberry MD  [facility]    Identified Health Risks:  Chart documentation done in part with Dragon Voice recognition Software. Although reviewed after completion, some word and grammatical error may remain.

## 2022-09-19 NOTE — RESULT ENCOUNTER NOTE
Antoni Lau,  Your lab test showed normal fasting blood sugar with no concern for diabetes, normal liver kidney functions, hemoglobin with no concern for anemia.  Your total cholesterol and triglycerides have improved from before but your LDL-bad cholesterol continues to be moderately elevated.  I would recommend you to continue with your efforts on healthy eating, regular exercises and portion control.  We will recheck the cholesterol at your next visit in 1 year or sooner if needed.   Let me know if you have any questions. Take care.  Erik Newberry MD

## 2022-10-03 ENCOUNTER — IMMUNIZATION (OUTPATIENT)
Dept: FAMILY MEDICINE | Facility: CLINIC | Age: 37
End: 2022-10-03
Payer: MEDICARE

## 2022-10-03 DIAGNOSIS — Z23 HIGH PRIORITY FOR 2019-NCOV VACCINE: Primary | ICD-10-CM

## 2022-10-03 PROCEDURE — 99207 PR NO CHARGE NURSE ONLY: CPT

## 2022-10-03 PROCEDURE — 91313 COVID-19,PF,MODERNA BIVALENT: CPT

## 2022-10-03 PROCEDURE — 0134A COVID-19,PF,MODERNA BIVALENT: CPT

## 2023-01-14 ENCOUNTER — HEALTH MAINTENANCE LETTER (OUTPATIENT)
Age: 38
End: 2023-01-14

## 2023-01-23 ENCOUNTER — LAB (OUTPATIENT)
Dept: FAMILY MEDICINE | Facility: CLINIC | Age: 38
End: 2023-01-23
Attending: FAMILY MEDICINE
Payer: MEDICARE

## 2023-01-23 DIAGNOSIS — Z20.822 SUSPECTED 2019 NOVEL CORONAVIRUS INFECTION: ICD-10-CM

## 2023-01-23 PROCEDURE — 99207 PR NO CHARGE NURSE ONLY: CPT

## 2023-01-23 PROCEDURE — U0005 INFEC AGEN DETEC AMPLI PROBE: HCPCS

## 2023-01-23 PROCEDURE — U0003 INFECTIOUS AGENT DETECTION BY NUCLEIC ACID (DNA OR RNA); SEVERE ACUTE RESPIRATORY SYNDROME CORONAVIRUS 2 (SARS-COV-2) (CORONAVIRUS DISEASE [COVID-19]), AMPLIFIED PROBE TECHNIQUE, MAKING USE OF HIGH THROUGHPUT TECHNOLOGIES AS DESCRIBED BY CMS-2020-01-R: HCPCS

## 2023-01-24 LAB — SARS-COV-2 RNA RESP QL NAA+PROBE: NEGATIVE

## 2023-12-09 ENCOUNTER — HEALTH MAINTENANCE LETTER (OUTPATIENT)
Age: 38
End: 2023-12-09

## 2024-03-03 SDOH — HEALTH STABILITY: PHYSICAL HEALTH: ON AVERAGE, HOW MANY MINUTES DO YOU ENGAGE IN EXERCISE AT THIS LEVEL?: 0 MIN

## 2024-03-03 SDOH — HEALTH STABILITY: PHYSICAL HEALTH: ON AVERAGE, HOW MANY DAYS PER WEEK DO YOU ENGAGE IN MODERATE TO STRENUOUS EXERCISE (LIKE A BRISK WALK)?: 0 DAYS

## 2024-03-03 ASSESSMENT — ASTHMA QUESTIONNAIRES
QUESTION_4 LAST FOUR WEEKS HOW OFTEN HAVE YOU USED YOUR RESCUE INHALER OR NEBULIZER MEDICATION (SUCH AS ALBUTEROL): NOT AT ALL
QUESTION_2 LAST FOUR WEEKS HOW OFTEN HAVE YOU HAD SHORTNESS OF BREATH: ONCE OR TWICE A WEEK
QUESTION_1 LAST FOUR WEEKS HOW MUCH OF THE TIME DID YOUR ASTHMA KEEP YOU FROM GETTING AS MUCH DONE AT WORK, SCHOOL OR AT HOME: NONE OF THE TIME
QUESTION_5 LAST FOUR WEEKS HOW WOULD YOU RATE YOUR ASTHMA CONTROL: COMPLETELY CONTROLLED
ACT_TOTALSCORE: 24
QUESTION_3 LAST FOUR WEEKS HOW OFTEN DID YOUR ASTHMA SYMPTOMS (WHEEZING, COUGHING, SHORTNESS OF BREATH, CHEST TIGHTNESS OR PAIN) WAKE YOU UP AT NIGHT OR EARLIER THAN USUAL IN THE MORNING: NOT AT ALL
ACT_TOTALSCORE: 24

## 2024-03-03 ASSESSMENT — SOCIAL DETERMINANTS OF HEALTH (SDOH): HOW OFTEN DO YOU GET TOGETHER WITH FRIENDS OR RELATIVES?: PATIENT DECLINED

## 2024-03-03 NOTE — COMMUNITY RESOURCES LIST (ENGLISH)
03/03/2024   [facility] [employer]  N/A  For questions about this resource list or additional care needs, please contact your primary care clinic or care manager.  Phone: [phone]   Email: N/A   Address: [address] [place] [id number]   Hours: N/A        Exercise and Recreation       Gym or workout facility  1  [facility] [employer] Distance: 7.61 miles      In-Person   [address] [place] [address]  Language: English  Hours: Mon - Fri 6:30 AM - 12:30 PM , Mon - Fri 3:00 PM - 7:00 PM , Sat - Sun 8:00 AM - 12:00 PM  Fees: Self Pay   Phone: [phone] Website: [email or web address]     2  [facility] [employer] [facility] Distance: 8.16 miles      In-Person   [facility] [address] [place] [address]  Language: English  Hours: Mon - Fri 5:30 AM - 1:00 PM , Mon - Fri 3:30 PM - 7:00 PM , Sat 8:00 AM - 12:00 PM  Fees: Self Pay   Phone: [phone] Website: [email or web address]          Important Numbers & Websites       Emergency Services   911  [facility]   311  Poison Control   (414) [phone]  Suicide Prevention Lifeline   (651) 667-8226 (TALK)  Child Abuse Hotline   (571) 719-8732 (4-A-Child)  Sexual Assault Hotline   (297) 968-9029 (HOPE)  National Runaway Safeline   (735) 328-1227 (RUNAWAY)  All-Options Talkline   (131) 890-3243  Substance Abuse Referral   (111) [phone] (HELP)

## 2024-03-05 ENCOUNTER — OFFICE VISIT (OUTPATIENT)
Dept: FAMILY MEDICINE | Facility: CLINIC | Age: 39
End: 2024-03-05
Payer: MEDICARE

## 2024-03-05 VITALS
WEIGHT: 237 LBS | DIASTOLIC BLOOD PRESSURE: 92 MMHG | OXYGEN SATURATION: 98 % | SYSTOLIC BLOOD PRESSURE: 157 MMHG | RESPIRATION RATE: 14 BRPM | HEIGHT: 73 IN | HEART RATE: 90 BPM | TEMPERATURE: 98.2 F | BODY MASS INDEX: 31.41 KG/M2

## 2024-03-05 DIAGNOSIS — F41.1 GENERALIZED ANXIETY DISORDER: ICD-10-CM

## 2024-03-05 DIAGNOSIS — E66.9 OBESITY (BMI 30-39.9): ICD-10-CM

## 2024-03-05 DIAGNOSIS — J45.20 MILD INTERMITTENT ASTHMA WITHOUT COMPLICATION: ICD-10-CM

## 2024-03-05 DIAGNOSIS — R03.0 ELEVATED BLOOD PRESSURE READING WITHOUT DIAGNOSIS OF HYPERTENSION: ICD-10-CM

## 2024-03-05 DIAGNOSIS — E78.2 MIXED HYPERLIPIDEMIA: ICD-10-CM

## 2024-03-05 DIAGNOSIS — F51.04 CHRONIC INSOMNIA: ICD-10-CM

## 2024-03-05 DIAGNOSIS — F25.9 SCHIZOAFFECTIVE DISORDER, UNSPECIFIED TYPE (H): ICD-10-CM

## 2024-03-05 DIAGNOSIS — Z00.00 ANNUAL PHYSICAL EXAM: Primary | ICD-10-CM

## 2024-03-05 PROBLEM — Z86.69 HISTORY OF EPILEPSY: Status: RESOLVED | Noted: 2020-01-04 | Resolved: 2024-03-05

## 2024-03-05 PROCEDURE — 99214 OFFICE O/P EST MOD 30 MIN: CPT | Mod: 25 | Performed by: INTERNAL MEDICINE

## 2024-03-05 PROCEDURE — G0439 PPPS, SUBSEQ VISIT: HCPCS | Performed by: INTERNAL MEDICINE

## 2024-03-05 ASSESSMENT — PAIN SCALES - GENERAL: PAINLEVEL: NO PAIN (0)

## 2024-03-05 NOTE — PROGRESS NOTES
"Preventive Care Visit  [facility]  Jose Mgcovern MD, Internal Medicine  Mar 5, 2024    Assessment & Plan     1.  Annual physical examination completed and overall good except for #2.  2. Elevated blood pressure without the diagnosis of hypertension.  Since low-salt diet discussed.  Advised to follow-up in 6 weeks with  or myself to reassess blood pressure.  3.  Mixed dyslipidemia the cholesterol in the past been high.  Will have patient come in fasting to have lipids checked along with CMP and CBC.  4.  Schizoaffective disorder.  He gets psychiatric care via telehealth with a provider in [place].  5.  Chronic insomnia of longstanding.  Currently on Lunesta 3 mg daily.  Patient requesting a referral to sleep medicine and referral placed.  6.  Generalized anxiety disorder.  He does use alprazolam 0.5 to 1 mg daily as needed.  7.  Mild intermittent asthma for which she has a rescue inhaler albuterol.  He has not had to use it in a long time.  8.  Obesity class I with a BMI of 31.  9.  Discussed getting Pneumovax, influenza vaccine and COVID booster vaccine.  Patient indicated he will consider another time.      BMI  Estimated body mass index is 31.41 kg/m  as calculated from the following:    Height as of this encounter: 1.85 m (6' 0.84\").    Weight as of this encounter: 107.5 kg (237 lb).   Weight management plan: Discussed healthy diet and exercise guidelines    Counseling  Appropriate preventive services were discussed with this patient, including applicable screening as appropriate for fall prevention, nutrition, physical activity, Tobacco-use cessation, weight loss and cognition.  Checklist reviewing preventive services available has been given to the patient.  Reviewed patient's diet, addressing concerns and/or questions.   The patient was instructed to see the dentist every 6 months.   Discussed possible causes of fatigue. The patient was provided with written information " regarding signs of hearing loss.       Subjective   Sid is a 38 year old, presenting for the following:  Wellness Visit (Annual wellness-would like to discuss getting sleep study done for insomnia if time allows at visit)        3/5/2024     2:36 PM   Additional Questions   Roomed by Kait MORELOS   Accompanied by Self         3/5/2024     2:36 PM   Patient Reported Additional Medications   Patient reports taking the following new medications Yes, see pt reviewed med list         Health Care Directive  Patient does not have a Health Care Directive or Living Will: Discussed advance care planning with patient; information given to patient to review.    HPI    38-year-old comes in for annual physical examination.  He has his schizoaffective disorder along with chronic insomnia and anxiety disorder for which he is in care of her mental health provider.  He has a history of mild intermittent asthma which has not bothered him in a while.  He has not had to use rescue inhaler.  He otherwise is doing okay          3/3/2024   General Health   How would you rate your overall physical health? (!) FAIR   Feel stress (tense, anxious, or unable to sleep) Rather much   (!) STRESS CONCERN      3/3/2024   Nutrition   Diet: Regular (no restrictions)         3/3/2024   Exercise   Days per week of moderate/strenous exercise 0 days   Average minutes spent exercising at this level 0 min   (!) EXERCISE CONCERN      3/3/2024   Social Factors   Frequency of gathering with friends or relatives Patient declined   Worry food won't last until get money to buy more No   Food not last or not have enough money for food? No   Do you have housing?  Yes   Are you worried about losing your housing? No   Lack of transportation? No   Unable to get utilities (heat,electricity)? No         3/3/2024   Fall Risk   Fallen 2 or more times in the past year? No   Trouble with walking or balance? No          3/3/2024   Activities of Daily Living- Home Safety    Needs help with the following daily activites None of the above   Safety concerns in the home None of the above         3/3/2024   Dental   Dentist two times every year? (!) NO         3/3/2024   Hearing Screening   Hearing concerns? (!) I NEED TO ASK PEOPLE TO SPEAK UP OR REPEAT THEMSELVES.    (!) IT'S HARD TO FOLLOW A CONVERSATION IN A NOISY RESTAURANT OR CROWDED ROOM.    (!) TROUBLE UNDERSTANDING SOFT OR WHISPERED SPEECH.         3/3/2024   Driving Risk Screening   Patient/family members have concerns about driving No         3/3/2024   General Alertness/Fatigue Screening   Have you been more tired than usual lately? (!) YES         3/3/2024   Urinary Incontinence Screening   Bothered by leaking urine in past 6 months No         3/3/2024   TB Screening   Were you born outside of [place]?  No         Today's PHQ-2 Score:       3/5/2024     1:41 PM   PHQ-2 (  [employer])   Q1: Little interest or pleasure in doing things 1   Q2: Feeling down, depressed or hopeless 1   PHQ-2 Score 2   Q1: Little interest or pleasure in doing things Several days   Q2: Feeling down, depressed or hopeless Several days   PHQ-2 Score 2           3/3/2024   Substance Use   Alcohol more than 3/day or more than 7/wk No   Do you have a current opioid prescription? No   How severe/bad is pain from 1 to 10? 1/10   Do you use any other substances recreationally? (!) ALCOHOL     Social History     Tobacco Use    Smoking status: Former     Packs/day: 1.50     Years: 5.00     Additional pack years: 0.00     Total pack years: 7.50     Types: Cigarettes     Start date: 10/11/1999     Quit date: 2006     Years since quittin.1    Smokeless tobacco: Never    Tobacco comments:     <1 ppd for most of the time (5-6 years)   Vaping Use    Vaping Use: Never used   Substance Use Topics    Alcohol use: Yes     Comment: socially, on holidays    Drug use: No             3/3/2024   Contraception/Family Planning   Questions about contraception or family  planning No         Reviewed and updated as needed this visit by Provider                    Past Medical History:   Diagnosis Date    Depressive disorder 09/08/1998    Epilepsy and recurrent seizures 1994    Reported seizures between 1994 and 1998.  None since.    Generalized anxiety disorder     Mild persistent asthma 10/25/2010    Panic disorder     Schizoaffective disorder      Past Surgical History:   Procedure Laterality Date    NO HISTORY OF SURGERY      none       Current providers sharing in care for this patient include:  Patient Care Team:  Erik Newberry MD as PCP - General (Family Practice)  Erik Newberry MD as Assigned PCP    The following health maintenance items are reviewed in Epic and correct as of today:  Health Maintenance   Topic Date Due    Pneumococcal Vaccine: Pediatrics (0 to 5 Years) and At-Risk Patients (6 to 64 Years) (1 of 2 - PCV) Never done    HEPATITIS B IMMUNIZATION (1 of 3 - 19+ 3-dose series) Never done    ASTHMA ACTION PLAN  09/13/2022    ANNUAL REVIEW OF HM ORDERS  04/04/2023    INFLUENZA VACCINE (1) 09/01/2023    COVID-19 Vaccine (4 - 2023-24 season) 09/01/2023    MEDICARE ANNUAL WELLNESS VISIT  09/16/2023    LIPID  09/16/2023    ASTHMA CONTROL TEST  09/05/2024    GLUCOSE  09/16/2025    ADVANCE CARE PLANNING  09/16/2027    DTAP/TDAP/TD IMMUNIZATION (3 - Td or Tdap) 06/13/2032    HEPATITIS C SCREENING  Completed    HIV SCREENING  Completed    PHQ-2 (once per calendar year)  Completed    IPV IMMUNIZATION  Aged Out    HPV IMMUNIZATION  Aged Out    MENINGITIS IMMUNIZATION  Aged Out    RSV MONOCLONAL ANTIBODY  Aged Out         Review of Systems  Constitutional, HEENT, cardiovascular, pulmonary, GI, , musculoskeletal, neuro, skin, endocrine and psych systems are negative, except as otherwise noted.     Objective    Exam  BP (!) 157/92 (BP Location: Right arm, Patient Position: Sitting, Cuff Size: Adult Regular)   Pulse 90   Temp 98.2  F (36.8  C) (Temporal)   Resp 14   " Ht 1.85 m (6' 0.84\")   Wt 107.5 kg (237 lb)   SpO2 98%   BMI 31.41 kg/m     Estimated body mass index is 31.41 kg/m  as calculated from the following:    Height as of this encounter: 1.85 m (6' 0.84\").    Weight as of this encounter: 107.5 kg (237 lb).    Physical Exam  GENERAL: alert and no distress  EYES: Eyes grossly normal to inspection, PERRL and conjunctivae and sclerae normal  HENT: ear canals and TM's normal, nose and mouth without ulcers or lesions  NECK: no adenopathy, no asymmetry, masses, or scars  RESP: lungs clear to auscultation - no rales, rhonchi or wheezes  CV: regular rate and rhythm, normal S1 S2, no S3 or S4, no murmur, click or rub, no peripheral edema  ABDOMEN: soft, nontender, no hepatosplenomegaly, no masses and bowel sounds normal   (male): normal male genitalia without lesions or urethral discharge, no hernia  MS: no gross musculoskeletal defects noted, no edema  SKIN: no suspicious lesions or rashes  NEURO: Normal strength and tone, mentation intact and speech normal  PSYCH: mentation appears normal, affect normal/bright  LYMPH: no cervical, supraclavicular, axillary, or inguinal adenopathy        3/5/2024   Mini Cog   Mini-Cog Not Completed (choose reason) Mental handicap   3 Item Recall 3 objects recalled            Signed Electronically by: Jose Mcgovern MD    "

## 2024-03-18 ENCOUNTER — LAB (OUTPATIENT)
Dept: LAB | Facility: CLINIC | Age: 39
End: 2024-03-18
Payer: MEDICARE

## 2024-03-18 DIAGNOSIS — E78.2 MIXED HYPERLIPIDEMIA: ICD-10-CM

## 2024-03-18 DIAGNOSIS — F25.9 SCHIZOAFFECTIVE DISORDER, UNSPECIFIED TYPE (H): ICD-10-CM

## 2024-03-18 DIAGNOSIS — R03.0 ELEVATED BLOOD PRESSURE READING WITHOUT DIAGNOSIS OF HYPERTENSION: ICD-10-CM

## 2024-03-18 LAB
ERYTHROCYTE [DISTWIDTH] IN BLOOD BY AUTOMATED COUNT: 13.1 % (ref 10–15)
HCT VFR BLD AUTO: 44.2 % (ref 40–53)
HGB BLD-MCNC: 14.7 G/DL (ref 13.3–17.7)
MCH RBC QN AUTO: 26.3 PG (ref 26.5–33)
MCHC RBC AUTO-ENTMCNC: 33.3 G/DL (ref 31.5–36.5)
MCV RBC AUTO: 79 FL (ref 78–100)
PLATELET # BLD AUTO: 255 10E3/UL (ref 150–450)
RBC # BLD AUTO: 5.59 10E6/UL (ref 4.4–5.9)
WBC # BLD AUTO: 6.3 10E3/UL (ref 4–11)

## 2024-03-18 PROCEDURE — 80053 COMPREHEN METABOLIC PANEL: CPT

## 2024-03-18 PROCEDURE — 36415 COLL VENOUS BLD VENIPUNCTURE: CPT

## 2024-03-18 PROCEDURE — 80061 LIPID PANEL: CPT

## 2024-03-18 PROCEDURE — 85027 COMPLETE CBC AUTOMATED: CPT

## 2024-03-19 LAB
ALBUMIN SERPL BCG-MCNC: 4.6 G/DL (ref 3.5–5.2)
ALP SERPL-CCNC: 90 U/L (ref 40–150)
ALT SERPL W P-5'-P-CCNC: 36 U/L (ref 0–70)
ANION GAP SERPL CALCULATED.3IONS-SCNC: 13 MMOL/L (ref 7–15)
AST SERPL W P-5'-P-CCNC: 27 U/L (ref 0–45)
BILIRUB SERPL-MCNC: 0.2 MG/DL
BUN SERPL-MCNC: 9.9 MG/DL (ref 6–20)
CALCIUM SERPL-MCNC: 9.6 MG/DL (ref 8.6–10)
CHLORIDE SERPL-SCNC: 104 MMOL/L (ref 98–107)
CHOLEST SERPL-MCNC: 253 MG/DL
CREAT SERPL-MCNC: 1 MG/DL (ref 0.67–1.17)
DEPRECATED HCO3 PLAS-SCNC: 23 MMOL/L (ref 22–29)
EGFRCR SERPLBLD CKD-EPI 2021: >90 ML/MIN/1.73M2
FASTING STATUS PATIENT QL REPORTED: NO
GLUCOSE SERPL-MCNC: 96 MG/DL (ref 70–99)
HDLC SERPL-MCNC: 47 MG/DL
LDLC SERPL CALC-MCNC: 150 MG/DL
NONHDLC SERPL-MCNC: 206 MG/DL
POTASSIUM SERPL-SCNC: 4.5 MMOL/L (ref 3.4–5.3)
PROT SERPL-MCNC: 7.6 G/DL (ref 6.4–8.3)
SODIUM SERPL-SCNC: 140 MMOL/L (ref 135–145)
TRIGL SERPL-MCNC: 281 MG/DL

## 2024-04-07 ENCOUNTER — NURSE TRIAGE (OUTPATIENT)
Dept: NURSING | Facility: CLINIC | Age: 39
End: 2024-04-07
Payer: MEDICARE

## 2024-04-07 NOTE — TELEPHONE ENCOUNTER
Patient calling. He's having nausea. He took Zoloft 25 mg for the first time, prescribed by an outside provider. He also had a spontaneous nose bleed and spit up some dried blood, and is having bilateral flank pain. He is not on any anticoagulant medications. History of panic disorder, and he thinks that the kidney pain may just be panic.     MD consult, Dr. HELGA Guardado Paged by RN at 6:02 AM  Reason for page: Nosebleed after starting Zoloft.     Provider, Dr. Guardado, returning page to [facility] at 6:06 AM  Provider Recommendations/Plan:  Probably not a common concern. The nosebleed may not be related to the Zoloft. Standard nosebleed precautions. If it continues, follow up with Dr. Newberry or the ordering provider.     Directions relayed to patient. He states understanding.     Carley Cota RN  [facility]  April 7, 2024, 6:13 AM    Reason for Disposition   [1] Caller has URGENT medicine question about med that PCP or specialist prescribed AND [2] triager unable to answer question    Additional Information   Negative: [1] Intentional drug overdose AND [2] suicidal thoughts or ideas   Negative: Drug overdose and triager unable to answer question   Negative: Caller requesting a renewal or refill of a medicine patient is currently taking   Negative: Caller requesting information unrelated to medicine   Negative: Caller requesting information about COVID-19 Vaccine   Negative: Caller requesting information about Emergency Contraception   Negative: Caller requesting information about Combined Birth Control Pills   Negative: Caller requesting information about Progestin Birth Control Pills   Negative: Caller requesting information about Post-Op pain or medicines   Negative: Caller requesting a prescription antibiotic (such as Penicillin) for Strep throat and has a positive culture result   Negative: Caller requesting a prescription anti-viral med (such as Tamiflu) and has influenza (flu) symptoms    Negative: Immunization reaction suspected   Negative: Rash while taking a medicine or within 3 days of stopping it   Negative: [1] Asthma and [2] having symptoms of asthma (cough, wheezing, etc.)   Negative: [1] Symptom of illness (e.g., headache, abdominal pain, earache, vomiting) AND [2] more than mild   Negative: Breastfeeding questions about mother's medicines and diet   Negative: MORE THAN A DOUBLE DOSE of a prescription or over-the-counter (OTC) drug   Negative: [1] DOUBLE DOSE (an extra dose or lesser amount) of prescription drug AND [2] any symptoms (e.g., dizziness, nausea, pain, sleepiness)   Negative: [1] DOUBLE DOSE (an extra dose or lesser amount) of over-the-counter (OTC) drug AND [2] any symptoms (e.g., dizziness, nausea, pain, sleepiness)   Negative: Took another person's prescription drug   Negative: [1] Pharmacy calling with prescription question AND [2] triager unable to answer question   Negative: Diabetes drug error or overdose (e.g., took wrong type of insulin or took extra dose)   Negative: [1] Prescription not at pharmacy AND [2] was prescribed by PCP recently (Exception: Triager has access to EMR and prescription is recorded there. Go to Home Care and confirm for pharmacy.)   Negative: [1] DOUBLE DOSE (an extra dose or lesser amount) of prescription drug AND [2] NO symptoms  (Exception: A double dose of antibiotics.)    Protocols used: Medication Question Call-A-

## 2024-04-23 ENCOUNTER — OFFICE VISIT (OUTPATIENT)
Dept: FAMILY MEDICINE | Facility: CLINIC | Age: 39
End: 2024-04-23
Payer: MEDICARE

## 2024-04-23 VITALS
SYSTOLIC BLOOD PRESSURE: 135 MMHG | WEIGHT: 241.3 LBS | HEART RATE: 79 BPM | OXYGEN SATURATION: 99 % | RESPIRATION RATE: 13 BRPM | BODY MASS INDEX: 32.68 KG/M2 | TEMPERATURE: 98.2 F | HEIGHT: 72 IN | DIASTOLIC BLOOD PRESSURE: 88 MMHG

## 2024-04-23 DIAGNOSIS — I10 ESSENTIAL HYPERTENSION: Primary | ICD-10-CM

## 2024-04-23 DIAGNOSIS — F25.9 SCHIZOAFFECTIVE DISORDER, UNSPECIFIED TYPE (H): ICD-10-CM

## 2024-04-23 DIAGNOSIS — E78.2 MIXED HYPERLIPIDEMIA: ICD-10-CM

## 2024-04-23 PROCEDURE — 99213 OFFICE O/P EST LOW 20 MIN: CPT | Performed by: INTERNAL MEDICINE

## 2024-04-23 ASSESSMENT — PAIN SCALES - GENERAL: PAINLEVEL: NO PAIN (0)

## 2024-04-23 NOTE — PROGRESS NOTES
Assessment & Plan     1.  Essential hypertension of new onset.  Previously given instruction on low-salt diet.  Advised starting and hypertensive medication like lisinopril.  Patient would like to wait and be rechecked in middle of the summer.  He is going to work on diet and exercise.  Will plan on follow-up in July with his primary provider.  2.  Mixed dyslipidemia with recent cholesterol 253 HDL 47  triglyceride 281 measured on 3/18/2024.  3.  Schizoaffective disorder.  He sees online psychiatrist.  Currently not taking any antipsychotic or antidepressant medication.    Hema Lau is a 39 year old, presenting for the following health issues:  No chief complaint on file.        4/23/2024     2:09 PM   Additional Questions   Roomed by Elisha   Accompanied by self     History of Present Illness       Reason for visit:  Physical results    He eats 2-3 servings of fruits and vegetables daily.He consumes 0 sweetened beverage(s) daily.He exercises with enough effort to increase his heart rate 30 to 60 minutes per day.  He exercises with enough effort to increase his heart rate 3 or less days per week.   He is taking medications regularly.       Patient came in for follow-up regarding elevated blood pressure noted during his physical exam.  He has not been checking his blood pressure at home.  He has been working on low-salt diet.  He has no other concerns.  He drinks couple cups of coffee a day and wonders if that is affecting his blood pressure.  Patient informed that that should not really affect his blood pressure much.        Objective    There were no vitals taken for this visit.  There is no height or weight on file to calculate BMI.  Physical Exam   GENERAL: alert and no distress  RESP: lungs clear to auscultation - no rales, rhonchi or wheezes  CV: regular rate and rhythm, normal S1 S2, no S3 or S4, no murmur, click or rub, no peripheral edema            Signed Electronically by: Jose Mcgovern,  MD

## 2024-06-24 ENCOUNTER — VIRTUAL VISIT (OUTPATIENT)
Dept: SLEEP MEDICINE | Facility: CLINIC | Age: 39
End: 2024-06-24
Attending: INTERNAL MEDICINE
Payer: MEDICARE

## 2024-06-24 VITALS — WEIGHT: 227 LBS | BODY MASS INDEX: 30.75 KG/M2 | HEIGHT: 72 IN

## 2024-06-24 DIAGNOSIS — G47.21 DELAYED SLEEP PHASE SYNDROME: Primary | ICD-10-CM

## 2024-06-24 DIAGNOSIS — I10 ESSENTIAL HYPERTENSION: ICD-10-CM

## 2024-06-24 DIAGNOSIS — F25.9 SCHIZOAFFECTIVE DISORDER, UNSPECIFIED TYPE (H): ICD-10-CM

## 2024-06-24 DIAGNOSIS — F51.04 CHRONIC INSOMNIA: ICD-10-CM

## 2024-06-24 DIAGNOSIS — E66.9 OBESITY (BMI 30-39.9): ICD-10-CM

## 2024-06-24 DIAGNOSIS — R06.81 WITNESSED APNEIC SPELLS: ICD-10-CM

## 2024-06-24 DIAGNOSIS — F41.1 GENERALIZED ANXIETY DISORDER: ICD-10-CM

## 2024-06-24 PROCEDURE — 99205 OFFICE O/P NEW HI 60 MIN: CPT | Mod: 95 | Performed by: PHYSICIAN ASSISTANT

## 2024-06-24 RX ORDER — ATOMOXETINE 18 MG/1
CAPSULE ORAL
COMMUNITY
Start: 2024-04-14

## 2024-06-24 ASSESSMENT — SLEEP AND FATIGUE QUESTIONNAIRES
HOW LIKELY ARE YOU TO NOD OFF OR FALL ASLEEP WHILE SITTING QUIETLY AFTER LUNCH WITHOUT ALCOHOL: SLIGHT CHANCE OF DOZING
HOW LIKELY ARE YOU TO NOD OFF OR FALL ASLEEP WHILE SITTING AND READING: SLIGHT CHANCE OF DOZING
HOW LIKELY ARE YOU TO NOD OFF OR FALL ASLEEP WHILE SITTING INACTIVE IN A PUBLIC PLACE: WOULD NEVER DOZE
HOW LIKELY ARE YOU TO NOD OFF OR FALL ASLEEP WHEN YOU ARE A PASSENGER IN A CAR FOR AN HOUR WITHOUT A BREAK: MODERATE CHANCE OF DOZING
HOW LIKELY ARE YOU TO NOD OFF OR FALL ASLEEP WHILE SITTING AND TALKING TO SOMEONE: WOULD NEVER DOZE
HOW LIKELY ARE YOU TO NOD OFF OR FALL ASLEEP WHILE WATCHING TV: WOULD NEVER DOZE
HOW LIKELY ARE YOU TO NOD OFF OR FALL ASLEEP WHILE LYING DOWN TO REST IN THE AFTERNOON WHEN CIRCUMSTANCES PERMIT: MODERATE CHANCE OF DOZING
HOW LIKELY ARE YOU TO NOD OFF OR FALL ASLEEP IN A CAR, WHILE STOPPED FOR A FEW MINUTES IN TRAFFIC: WOULD NEVER DOZE

## 2024-06-24 ASSESSMENT — PAIN SCALES - GENERAL: PAINLEVEL: NO PAIN (0)

## 2024-06-24 NOTE — PATIENT INSTRUCTIONS
"          MY TREATMENT INFORMATION FOR SLEEP APNEA-  Sid Zarco  Frequently asked questions:  1. What is Obstructive Sleep Apnea (KELLEY)? KELLEY is the most common type of sleep apnea. Apnea means, \"without breath.\"  Apnea is most often caused by narrowing or collapse of the upper airway as muscles relax during sleep.   Almost everyone has occasional apneas. Most people with sleep apnea have had brief interruptions at night frequently for many years.  The severity of sleep apnea is related to how frequent and severe the events are.   2. What are the consequences of KELLEY? Symptoms include: feeling sleepy during the day, snoring loudly, gasping or stopping of breathing, trouble sleeping, and occasionally morning headaches or heartburn at night.  Sleepiness can be serious and even increase the risk of falling asleep while driving. Other health consequences may include development of high blood pressure and other cardiovascular disease in persons who are susceptible. Untreated KELLEY  can contribute to heart disease, stroke and diabetes.   3. What are the treatment options? In most situations, sleep apnea is a lifelong disease that must be managed with daily therapy. Medications are not effective for sleep apnea and surgery is generally not considered until other therapies have been tried. Your treatment is your choice . Continuous Positive Airway (CPAP) works right away and is the therapy that is effective in nearly everyone. An oral device to hold your jaw forward is usually the next most reliable option. Other options include postioning devices (to keep you off your back), weight loss, and surgery including a tongue pacing device. There is more detail about some of these options below.  4. Are my sleep studies covered by insurance? Although we will request verification of coverage, we advise you also check in advance of the study to ensure there is coverage.    Important tips for those choosing CPAP and similar " devices  REMEMBER-IF YOU RECEIVE A CALL FROM  [phone]-->IT IS TO SETUP A DEVICE  For new devices, sign up for device JOYA to monitor your device for your followup visits  We encourage you to utilize the [employer] joya or website ( https://Comply Serve.Aquicore/ ) to monitor your therapy progress and share the data with your healthcare team when you discuss your sleep apnea.                                                    Know your equipment:  CPAP is continuous positive airway pressure that prevents obstructive sleep apnea by keeping the throat from collapsing while you are sleeping. In most cases, the device is  smart  and can slowly self-adjusts if your throat collapses and keeps a record every day of how well you are treated-this information is available to you and your care team.  BPAP is bilevel positive airway pressure that keeps your throat open and also assists each breath with a pressure boost to maintain adequate breathing.  Special kinds of BPAP are used in patients who have inadequate breathing from lung or heart disease. In most cases, the device is  smart  and can slowly self-adjusts to assist breathing. Like CPAP, the device keeps a record of how well you are treated.  Your mask is your connection to the device. You get to choose what feels most comfortable and the staff will help to make sure if fits. Here: are some examples of the different masks that are available: Magnetic mask aids may assist with use but there are safety issues that should be addressed when considering with magnets* ( see end of discussion).       Key points to remember on your journey with sleep apnea:  Sleep study.  PAP devices often need to be adjusted during a sleep study to show that they are effective and adjusted right.  Good tips to remember: Try wearing just the mask during a quiet time during the day so your body adapts to wearing it. A humidifier is recommended for comfort in most cases to prevent drying of  your nose and throat. Allergy medication from your provider may help you if you are having nasal congestion.  Getting settled-in. It takes more than one night for most of us to get used to wearing a mask. Try wearing just the mask during a quiet time during the day so your body adapts to wearing it. A humidifier is recommended for comfort in most cases. Our team will work with you carefully on the first day and will be in contact within 4 days and again at 2 and 4 weeks for advice and remote device adjustments. Your therapy is evaluated by the device each day.   Use it every night. The more you are able to sleep naturally for 7-8 hours, the more likely you will have good sleep and to prevent health risks or symptoms from sleep apnea. Even if you use it 4 hours it helps. Occasionally all of us are unable to use a medical therapy, in sleep apnea, it is not dangerous to miss one night.   Communicate. Call our skilled team on the number provided on the first day if your visit for problems that make it difficult to wear the device. Over 2 out of 3 patients can learn to wear the device long-term with help from our team. Remember to call our team or your sleep providers if you are unable to wear the device as we may have other solutions for those who cannot adapt to mask CPAP therapy. It is recommended that you sleep your sleep provider within the first 3 months and yearly after that if you are not having problems.   Use it for your health. We encourage use of CPAP masks during daytime quiet periods to allow your face and brain to adapt to the sensation of CPAP so that it will be a more natural sensation to awaken to at night or during naps. This can be very useful during the first few weeks or months of adapting to CPAP though it does not help medically to wear CPAP during wakefulness and  should not be used as a strategy just to meet guidelines.  Take care of your equipment. Make sure you clean your mask and tubing using  directions every day and that your filter and mask are replaced as recommended or if they are not working.     *Masks with magnets:  Updated Contraindications  Masks with magnetic components are contraindicated for use by patients where they, or anyone in close physical contact while using the mask, have the following:   Active medical implants that interact with magnets (i.e., pacemakers, implantable cardioverter defibrillators (ICD), neurostimulators, cerebrospinal fluid (CSF) shunts, insulin/infusion pumps)   Metallic implants/objects containing ferromagnetic material (i.e., aneurysm clips/flow disruption devices, embolic coils, stents, valves, electrodes, implants to restore hearing or balance with implanted magnets, ocular implants, metallic splinters in the eye)  Updated Warning  Keep the mask magnets at a safe distance of at least 6 inches (150 mm) away from implants or medical devices that may be adversely affected by magnetic interference. This warning applies to you or anyone in close physical contact with your mask. The magnets are in the frame and lower headgear clips, with a magnetic field strength of up to 400mT. When worn, they connect to secure the mask but may inadvertently detach while asleep.  Implants/medical devices, including those listed within contraindications, may be adversely affected if they change function under external magnetic fields or contain ferromagnetic materials that attract/repel to magnetic fields (some metallic implants, e.g., contact lenses with metal, dental implants, metallic cranial plates, screws, griffin hole covers, and bone substitute devices). Consult your physician and  of your implant / other medical device for information on the potential adverse effects of magnetic fields.    BESIDES CPAP, WHAT OTHER THERAPIES ARE THERE?    Positioning Device  Positioning devices are generally used when sleep apnea is mild and only occurs on your back.This example shows  a pillow that straps around the waist. It may be appropriate for those whose sleep study shows milder sleep apnea that occurs primarily when lying flat on one's back. Preliminary studies have shown benefit but effectiveness at home may need to be verified by a home sleep test. These devices are generally not covered by medical insurance.  Examples of devices that maintain sleeping on the back to prevent snoring and mild sleep apnea.    Belt type body positioner  http://SemaConnect.PeopleLinx/    Electronic reminder  http://nightshifttherapy.com/            Oral Appliance  What is oral appliance therapy?  An oral appliance device fits on your teeth at night like a retainer used after having braces. The device is made by a specialized dentist and requires several visits over 1-2 months before a manufactured device is made to fit your teeth and is adjusted to prevent your sleep apnea. Once an oral device is working properly, snoring should be improved. A home sleep test may be recommended at that time if to determine whether the sleep apnea is adequately treated.       Some things to remember:  -Oral devices are often, but not always, covered by your medical insurance. Be sure to check with your insurance provider.   -If you are referred for oral therapy, you will be given a list of specialized dentists to consider or you may choose to visit the Web site of the American Academy of Dental Sleep Medicine  -Oral devices are less likely to work if you have severe sleep apnea or are extremely overweight.     More detailed information  An oral appliance is a small acrylic device that fits over the upper and lower teeth  (similar to a retainer or a mouth guard). This device slightly moves jaw forward, which moves the base of the tongue forward, opens the airway, improves breathing for effective treat snoring and obstructive sleep apnea in perhaps 7 out of 10 people .  The best working devices are custom-made by a dental device   after a mold is made of the teeth 1, 2, 3.  When is an oral appliance indicated?  Oral appliance therapy is recommended as a first-line treatment for patients with primary snoring, mild sleep apnea, and for patients with moderate sleep apnea who prefer appliance therapy to use of CPAP4, 5. Severity of sleep apnea is determined by sleep testing and is based on the number of respiratory events per hour of sleep.   How successful is oral appliance therapy?  The success rate of oral appliance therapy in patients with mild sleep apnea is 75-80% while in patients with moderate sleep apnea it is 50-70%. The chance of success in patients with severe sleep apnea is 40-50%. The research also shows that oral appliances have a beneficial effect on the cardiovascular health of KELLEY patients at the same magnitude as CPAP therapy7.  Oral appliances should be a second-line treatment in cases of severe sleep apnea, but if not completely successful then a combination therapy utilizing CPAP plus oral appliance therapy may be effective. Oral appliances tend to be effective in a broad range of patients although studies show that the patients who have the highest success are females, younger patients, those with milder disease, and less severe obesity. 3, 6.   Finding a dentist that practices dental sleep medicine  Specific training is available through the American Academy of Dental Sleep Medicine for dentists interested in working in the field of sleep. To find a dentist who is educated in the field of sleep and the use of oral appliances, near you, visit the Web site of the American Academy of Dental Sleep Medicine.    References  1. Maylin, et al. Objectively measured vs self-reported compliance during oral appliance therapy for sleep-disordered breathing. Chest 2013; 144(5): 6051-4655.  2. Merly et al. Objective measurement of compliance during oral appliance therapy for sleep-disordered breathing. Thorax  2013; 68(1): 91-96.  3. Melissa et al. Mandibular advancement devices in 620 men and women with KELLEY and snoring: tolerability and predictors of treatment success. Chest 2004; 125: 0639-9031.  4. Néstor et al. Oral appliances for snoring and KLELEY: a review. Sleep 2006; 29: 244-262.  5. Josafat et al. Oral appliance treatment for KELLEY: an update. J Clin Sleep Med 2014; 10(2): 215-227.  6. Lakeisha et al. Predictors of OSAH treatment outcome. J Dent Res 2007; 86: 7691-5256.      Weight Loss:   Your Body mass index is 30.79 kg/m .    Being overweight does not necessarily mean you will have health consequences.  Those who have BMI over 35 or over 27 with existing medical conditions carries greater risk.   Weight loss decreases severity of sleep apnea in most people with obesity. For those with mild obesity who have developed snoring with weight gain, even 15-30 pound weight loss can improve and occasionally milder eliminate sleep apnea.  Structured and life-long dietary and health habits are necessary to lose weight and keep healthier weight levels.     The Comprehensive Weight loss program offers all aspects of weight loss strategies including two Non-Surgical Weight Loss Programs: Medical Weight Management and our 24 Week Healthy Lifestyle Program:    Medical Weight Management: You will meet with a Medical Weight Management Provider, as well as a Registered Dietician. The program may include medication therapy, dietary education, recommended exercise and physical therapy programs, monthly support group meetings, and possible psychological counseling. Follow up visits with the provider or dietician are scheduled based on your progress and needs.    24 Week Healthy Lifestyle Program: This unique program is designed to give you the support of weekly appointments and activities thru a 24-week period. It may include all of the components of the basic program (above), with the addition of 11 individual Health   Visits, 24-week access to the [employer] website for over 700 online classes, and monthly support group meetings. This program has an out-of-pocket expense of $499 to cover the items that can not be billed to insurance (health coaches and [employer] access), and is non-refundable/non-transferable (you may be able to use a Health Savings Account; ask your HSA provider). There may be an optional meal replacement plan prescribed as well.   Surgical management achieves meaningful long-term weight loss and improvement in health risks in most patients with more severe obesity.      Sleep Apnea Surgery:    Surgery for obstructive sleep apnea is considered generally only when other therapies fail to work. Surgery may be discussed with you if you are having a difficult time tolerating CPAP and or when there is an abnormal structure that requires surgical correction.  Nose and throat surgeries often enlarge the airway to prevent collapse.  Most of these surgeries create pain for 1-2 weeks and up to half of the most common surgeries are not effective throughout life.  You should carefully discuss the benefits and drawbacks to surgery with your sleep provider and surgeon to determine if it is the best solution for you.   More information  Surgery for KELLEY is directed at areas that are responsible for narrowing or complete obstruction of the airway during sleep.  There are a wide range of procedures available to enlarge and/or stabilize the airway to prevent blockage of breathing in the three major areas where it can occur: the palate, tongue, and nasal regions.  Successful surgical treatment depends on the accurate identification of the factors responsible for obstructive sleep apnea in each person.  A personalized approach is required because there is no single treatment that works well for everyone.  Because of anatomic variation, consultation with an examination by a sleep surgeon is a critical first step in determining what  surgical options are best for each patient.  In some cases, examination during sedation may be recommended in order to guide the selection of procedures.  Patients will be counseled about risks and benefits as well as the typical recovery course after surgery. Surgery is typically not a cure for a person s KELLEY.  However, surgery will often significantly improve one s KELLEY severity (termed  success rate ).  Even in the absence of a cure, surgery will decrease the cardiovascular risk associated with OSA7; improve overall quality of life8 (sleepiness, functionality, sleep quality, etc).      Palate Procedures:  Patients with KELLEY often have narrowing of their airway in the region of their tonsils and uvula.  The goals of palate procedures are to widen the airway in this region as well as to help the tissues resist collapse.  Modern palate procedure techniques focus on tissue conservation and soft tissue rearrangement, rather than tissue removal.  Often the uvula is preserved in this procedure. Residual sleep apnea is common in patient after pharyngoplasty with an average reduction in sleep apnea events of 33%2.      Tongue Procedures:  ExamWhile patients are awake, the muscles that surround the throat are active and keep this region open for breathing. These muscles relax during sleep, allowing the tongue and other structures to collapse and block breathing.  There are several different tongue procedures available.  Selection of a tongue base procedure depends on characteristics seen on physical exam.  Generally, procedures are aimed at removing bulky tissues in this area or preventing the back of the tongue from falling back during sleep.  Success rates for tongue surgery range from 50-62%3.    Hypoglossal Nerve Stimulation:  Hypoglossal nerve stimulation has recently received approval from the [place] Food and Drug Administration for the treatment of obstructive sleep apnea.  This is based on research showing  that the system was safe and effective in treating sleep apnea6.  Results showed that the median AHI score decreased 68%, from 29.3 to 9.0. This therapy uses an implant system that senses breathing patterns and delivers mild stimulation to airway muscles, which keeps the airway open during sleep.  The system consists of three fully implanted components: a small generator (similar in size to a pacemaker), a breathing sensor, and a stimulation lead.  Using a small handheld remote, a patient turns the therapy on before bed and off upon awakening.    Candidates for this device must be greater than 18 years of age, have moderate to severe obstructive sleep apnea with less than 25% central events  (AHI between 15-65), BMI less than 35, have tried CPAP/oral appliance for at least 8 weeks without success, and have appropriate upper airway anatomy (determined by a sleep endoscopy performed by Dr. Khanh Devine or Dr. Yon Jones).    Nasal Procedures:  Nasal obstruction can interfere with nasal breathing during the day and night.  Studies have shown that relief of nasal obstruction can improve the ability of some patients to tolerate positive airway pressure therapy for obstructive sleep apnea1.  Treatment options include medications such as nasal saline, topical corticosteroid and antihistamine sprays, and oral medications such as antihistamines or decongestants. Non-surgical treatments can include external nasal dilators for selected patients. If these are not successful by themselves, surgery can improve the nasal airway either alone or in combination with these other options.        Combination Procedures:  Combination of surgical procedures and other treatments may be recommended, particularly if patients have more than one area of narrowing or persistent positional disease.  The success rate of combination surgery ranges from 66-80%2,3.    References  Sam MORENO. The Role of the Nose in Snoring and Obstructive Sleep  Apnoea: An Update.  Eur Arch Otorhinolaryngol. 2011; 268: 1365-73.   Hanna SM; Matthew JA; Jese JR; Pallanch JF; Ryne MB; Marky SG; Soham RICHARDS. Surgical modifications of the upper airway for obstructive sleep apnea in adults: a systematic review and meta-analysis. SLEEP 2010;33(10):3613-3209. Jsesie CARUSO. Hypopharyngeal surgery in obstructive sleep apnea: an evidence-based medicine review.  Arch Otolaryngol Head Neck Surg. 2006 Feb;132(2):206-13.  Marc YH1, Jens Y, Raul MELISSA. The efficacy of anatomically based multilevel surgery for obstructive sleep apnea. Otolaryngol Head Neck Surg. 2003 Oct;129(4):327-35.  Kezirian E, Goldberg A. Hypopharyngeal Surgery in Obstructive Sleep Apnea: An Evidence-Based Medicine Review. Arch Otolaryngol Head Neck Surg. 2006 Feb;132(2):206-13.  Loren SAMUELS et al. Upper-Airway Stimulation for Obstructive Sleep Apnea.  N Engl J Med. 2014 Jan 9;370(2):139-49.  Peker Y et al. Increased Incidence of Cardiovascular Disease in Middle-aged Men with Obstructive Sleep Apnea. Am J Respir Crit Care Med; 2002 166: 159-165  Saleem EM et al. Studying Life Effects and Effectiveness of Palatopharyngoplasty (SLEEP) study: Subjective Outcomes of Isolated Uvulopalatopharyngoplasty. Otolaryngol Head Neck Surg. 2011; 144: 623-631.        WHAT IF I ONLY HAVE SNORING?    Mandibular advancement devices, lateral sleep positioning, long-term weight loss and treatment of nasal allergies have been shown to improve snoring.  Exercising tongue muscles with a game (https://apps.HandelabraGames.[employer]/us/joya/soundly-reduce-snoring/rh5401162179) or stimulating the tongue during the day with a device (https://doi.org/10.3390/qol14921483) have improved snoring in some individuals.  https://www.CodeHS.com/  https://www.sleepfoundation.org/best-anti-snoring-mouthpieces-and-mouthguards    Remember to Drive Safe... Drive Alive     Sleep health profoundly affects your health, mood, and your safety.  Thirty three percent of the  population (one in three of us) is not getting enough sleep and many have a sleep disorder. Not getting enough sleep or having an untreated / undertreated sleep condition may make us sleepy without even knowing it. In fact, our driving could be dramatically impaired due to our sleep health. As your provider, here are some things I would like you to know about driving:     Here are some warning signs for impairment and dangerous drowsy driving:              -Having been awake more than 16 hours               -Looking tired               -Eyelid drooping              -Head nodding (it could be too late at this point)              -Driving for more than 30 minutes     Some things you could do to make the driving safer if you are experiencing some drowsiness:              -Stop driving and rest              -Call for transportation              -Make sure your sleep disorder is adequately treated     Some things that have been shown NOT to work when experiencing drowsiness while driving:              -Turning on the radio              -Opening windows              -Eating any  distracting  /  entertaining  foods (e.g., sunflower seeds, candy, or any other)              -Talking on the phone      Your decision may not only impact your life, but also the life of others. Please, remember to drive safe for yourself and all of us.
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
## 2024-07-02 ENCOUNTER — TELEPHONE (OUTPATIENT)
Dept: FAMILY MEDICINE | Facility: CLINIC | Age: 39
End: 2024-07-02
Payer: MEDICARE

## 2024-07-02 NOTE — TELEPHONE ENCOUNTER
Patient has a letter for a loss of consciousness form for the provider to fill out. Patient is asking that the provider or the care team reach out when finished and faxed over to the [place] department of drivers and vehicle safety with in 10 business days that means no later than July 16 th . Please advise. Thank you.       Stephanie Crowley on 7/2/2024 at 5:43 PM

## 2024-07-03 NOTE — TELEPHONE ENCOUNTER
Since I have not seen him in 2 years, he can have it signed by Dr.. Mcgovern, last visit provider , if this cannot wait until his appointment with me this month

## 2024-07-03 NOTE — TELEPHONE ENCOUNTER
Patient calling about form being signed by provider. Writer relayed provider message below as written and stated he would need a provider appointment. He states he does have an appt. With PCP on 7/16/24.  He also states that he had a couple visits with Dr. Mcgovern because he could not get in sooner to see Dr. Newberry.      He lastly states that It's important that he would need to have them filled out because it's due real soon and will lose license if it cannot get filled out. Patient asking if provider would reconsider.    Routing to provider to review and advise.     Ryan Brunson RN  [facility]

## 2024-07-05 NOTE — TELEPHONE ENCOUNTER
I can look at the form and see if I can completed.  I did see him for annual physical examination in March and that subsequently in April for hypertension.  I did not see him for loss of consciousness.    Please inform patient that I will not be able to complete the form if it is related to loss of consciousness episode.  I did not evaluate him for that.

## 2024-07-11 ENCOUNTER — TELEPHONE (OUTPATIENT)
Dept: FAMILY MEDICINE | Facility: CLINIC | Age: 39
End: 2024-07-11

## 2024-07-11 NOTE — TELEPHONE ENCOUNTER
Reason for Call:  Appointment Request    Patient requesting this type of appt:  SS    Requested provider:  KATHERINE    Reason patient unable to be scheduled: Not within requested timeframe    When does patient want to be seen/preferred time:  Sooner than scheduled    Comments:     [facility] or would you prefer to receive a phone call?:   Patient would prefer a phone call   Okay to leave a detailed message?: Yes at Cell number on file:    Telephone Information:   Mobile [phone]       Call taken on 7/11/2024 at 11:43 AM by Umu Stone

## 2024-07-16 ENCOUNTER — OFFICE VISIT (OUTPATIENT)
Dept: FAMILY MEDICINE | Facility: CLINIC | Age: 39
End: 2024-07-16
Payer: MEDICARE

## 2024-07-16 VITALS
WEIGHT: 231.7 LBS | BODY MASS INDEX: 31.42 KG/M2 | HEART RATE: 89 BPM | DIASTOLIC BLOOD PRESSURE: 80 MMHG | TEMPERATURE: 98.7 F | RESPIRATION RATE: 12 BRPM | SYSTOLIC BLOOD PRESSURE: 114 MMHG | OXYGEN SATURATION: 97 %

## 2024-07-16 DIAGNOSIS — J45.20 MILD INTERMITTENT ASTHMA WITHOUT COMPLICATION: Primary | ICD-10-CM

## 2024-07-16 DIAGNOSIS — L65.9 HAIR LOSS: ICD-10-CM

## 2024-07-16 DIAGNOSIS — Z87.898 HISTORY OF SEIZURE: ICD-10-CM

## 2024-07-16 DIAGNOSIS — R03.0 ELEVATED BP WITHOUT DIAGNOSIS OF HYPERTENSION: ICD-10-CM

## 2024-07-16 DIAGNOSIS — E66.09 OTHER OBESITY DUE TO EXCESS CALORIES: ICD-10-CM

## 2024-07-16 DIAGNOSIS — Z13.21 ENCOUNTER FOR VITAMIN DEFICIENCY SCREENING: ICD-10-CM

## 2024-07-16 DIAGNOSIS — F51.04 CHRONIC INSOMNIA: ICD-10-CM

## 2024-07-16 PROCEDURE — 82607 VITAMIN B-12: CPT | Performed by: FAMILY MEDICINE

## 2024-07-16 PROCEDURE — 36415 COLL VENOUS BLD VENIPUNCTURE: CPT | Performed by: FAMILY MEDICINE

## 2024-07-16 PROCEDURE — 82306 VITAMIN D 25 HYDROXY: CPT | Performed by: FAMILY MEDICINE

## 2024-07-16 PROCEDURE — 84443 ASSAY THYROID STIM HORMONE: CPT | Performed by: FAMILY MEDICINE

## 2024-07-16 PROCEDURE — 99214 OFFICE O/P EST MOD 30 MIN: CPT | Performed by: FAMILY MEDICINE

## 2024-07-16 PROCEDURE — 82728 ASSAY OF FERRITIN: CPT | Performed by: FAMILY MEDICINE

## 2024-07-16 PROCEDURE — G2211 COMPLEX E/M VISIT ADD ON: HCPCS | Performed by: FAMILY MEDICINE

## 2024-07-16 RX ORDER — ALBUTEROL SULFATE 90 UG/1
AEROSOL, METERED RESPIRATORY (INHALATION)
Qty: 18 G | Refills: 3 | Status: SHIPPED | OUTPATIENT
Start: 2024-07-16

## 2024-07-16 ASSESSMENT — PAIN SCALES - GENERAL: PAINLEVEL: NO PAIN (0)

## 2024-07-16 ASSESSMENT — ASTHMA QUESTIONNAIRES: ACT_TOTALSCORE: 25

## 2024-07-16 NOTE — LETTER
My Asthma Action Plan    Name: Sid Zarco   YOB: 1985  Date: 7/16/2024   My doctor: Erik Newberry MD   My clinic: [facility]        My Rescue Medicine:   Albuterol inhaler (Proair/Ventolin/Proventil HFA)  2-4 puffs EVERY 4 HOURS as needed. Use a spacer if recommended by your provider.   My Asthma Severity:   Intermittent / Exercise Induced  Know your asthma triggers: upper respiratory infections and pollens  upper respiratory infections          GREEN ZONE   Good Control  I feel good  No cough or wheeze  Can work, sleep and play without asthma symptoms       Take your asthma control medicine every day.     If exercise triggers your asthma, take your rescue medication  15 minutes before exercise or sports, and  During exercise if you have asthma symptoms  Spacer to use with inhaler: If you have a spacer, make sure to use it with your inhaler             YELLOW ZONE Getting Worse  I have ANY of these:  I do not feel good  Cough or wheeze  Chest feels tight  Wake up at night   Keep taking your Green Zone medications  Start taking your rescue medicine:  every 20 minutes for up to 1 hour. Then every 4 hours for 24-48 hours.  If you stay in the Yellow Zone for more than 12-24 hours, contact your doctor.  If you do not return to the Green Zone in 12-24 hours or you get worse, start taking your oral steroid medicine if prescribed by your provider.           RED ZONE Medical Alert - Get Help  I have ANY of these:  I feel awful  Medicine is not helping  Breathing getting harder  Trouble walking or talking  Nose opens wide to breathe       Take your rescue medicine NOW  If your provider has prescribed an oral steroid medicine, start taking it NOW  Call your doctor NOW  If you are still in the Red Zone after 20 minutes and you have not reached your doctor:  Take your rescue medicine again and  Call 911 or go to the emergency room right away    See your regular doctor within 2  weeks of an Emergency Room or Urgent Care visit for follow-up treatment.          Annual Reminders:  Meet with Asthma Educator,  Flu Shot in the Fall, consider Pneumonia Vaccination for patients with asthma (aged 19 and older).    Pharmacy:    [id number] [place], [place] - [address] AT [id number] - [place], [place] - [address]    Electronically signed by Erik Newberry MD   Date: 07/16/24                    Asthma Triggers  How To Control Things That Make Your Asthma Worse    Triggers are things that make your asthma worse.  Look at the list below to help you find your triggers and   what you can do about them. You can help prevent asthma flare-ups by staying away from your triggers.      Trigger                                                          What you can do   Cigarette Smoke  Tobacco smoke can make asthma worse. Do not allow smoking in your home, car or around you.  Be sure no one smokes at a child s day care or school.  If you smoke, ask your health care provider for ways to help you quit.  Ask family members to quit too.  Ask your health care provider for a referral to Quit Plan to help you quit smoking, or call 0-983-757-PLAN.     Colds, Flu, Bronchitis  These are common triggers of asthma. Wash your hands often.  Don t touch your eyes, nose or mouth.  Get a flu shot every year.     Dust Mites  These are tiny bugs that live in cloth or carpet. They are too small to see. Wash sheets and blankets in hot water every week.   Encase pillows and mattress in dust mite proof covers.  Avoid having carpet if you can. If you have carpet, vacuum weekly.   Use a dust mask and HEPA vacuum.   Pollen and Outdoor Mold  Some people are allergic to trees, grass, or weed pollen, or molds. Try to keep your windows closed.  Limit time out doors when pollen count is high.   Ask you health care provider about taking medicine during allergy  season.     Animal Dander  Some people are allergic to skin flakes, urine or saliva from pets with fur or feathers. Keep pets with fur or feathers out of your home.    If you can t keep the pet outdoors, then keep the pet out of your bedroom.  Keep the bedroom door closed.  Keep pets off cloth furniture and away from stuffed toys.     Mice, Rats, and Cockroaches  Some people are allergic to the waste from these pests.   Cover food and garbage.  Clean up spills and food crumbs.  Store grease in the refrigerator.   Keep food out of the bedroom.   Indoor Mold  This can be a trigger if your home has high moisture. Fix leaking faucets, pipes, or other sources of water.   Clean moldy surfaces.  Dehumidify basement if it is damp and smelly.   Smoke, Strong Odors, and Sprays  These can reduce air quality. Stay away from strong odors and sprays, such as perfume, powder, hair spray, paints, smoke incense, paint, cleaning products, candles and new carpet.   Exercise or Sports  Some people with asthma have this trigger. Be active!  Ask your doctor about taking medicine before sports or exercise to prevent symptoms.    Warm up for 5-10 minutes before and after sports or exercise.     Other Triggers of Asthma  Cold air:  Cover your nose and mouth with a scarf.  Sometimes laughing or crying can be a trigger.  Some medicines and food can trigger asthma.

## 2024-07-16 NOTE — PROGRESS NOTES
Assessment & Plan     Mild intermittent asthma without complication      9/15/2022     9:07 PM 3/3/2024     5:28 AM 7/16/2024     3:01 PM   ACT Total Scores   ACT TOTAL SCORE (Goal Greater than or Equal to 20) 25 24 25   In the past 12 months, how many times did you visit the emergency room for your asthma without being admitted to the hospital? 0 0 0   In the past 12 months, how many times were you hospitalized overnight because of your asthma? 0 0 0     Stable and well-controlled, continue with albuterol inhaler as needed, avoid potential triggers, recheck in 1 year or sooner if needed  - albuterol (PROAIR HFA/PROVENTIL HFA/VENTOLIN HFA) 108 (90 Base) MCG/ACT inhaler; INHALE 2 PUFFS BY MOUTH INTO THE LUNG EVERY 6 HOURS AS NEEDED SHORTNESS OF BREATH, DYSPNEA  - Asthma Action Plan (AAP)    Elevated BP without diagnosis of hypertension  BP Readings from Last 6 Encounters:   07/16/24 114/80   04/23/24 135/88   03/05/24 (!) 157/92   09/16/22 138/89   06/13/22 130/86   04/04/22 130/82     Reviewed normal blood pressure numbers, patient was reassured   continue with weight loss efforts, low-salt diet, regular exercises      Chronic insomnia  Patient is currently following with sleep physician for further evaluation of his chronic insomnia on Lunesta 3 mg now,    History of seizure  Last episode was in 2015   Had history of petit mal seizure  Patient reports having his DMV paperwork filled by his psychiatrist    Hair loss  Unknown etiology  Will refer to dermatology for further evaluation along with getting labs for screening  - Vitamin B12; Future  - Vitamin D Deficiency; Future  - Ferritin; Future  - TSH with free T4 reflex; Future  - Adult Dermatology  Referral; Future  - Vitamin B12  - Vitamin D Deficiency  - Ferritin  - TSH with free T4 reflex    Encounter for vitamin deficiency screening  as above    - Vitamin B12; Future  - Vitamin D Deficiency; Future  - Vitamin B12  - Vitamin D Deficiency    Other  obesity due to excess calories  Wt Readings from Last 5 Encounters:   07/16/24 105.1 kg (231 lb 11.2 oz)   06/24/24 103 kg (227 lb)   04/23/24 109.5 kg (241 lb 4.8 oz)   03/05/24 107.5 kg (237 lb)   09/16/22 109.1 kg (240 lb 9.6 oz)     Emphasized on starting a regular exercise program including brisk walking for at least 30 to 45 minutes, portion control, healthy eating, recheck in 1 year  - Vitamin D Deficiency; Future  - Vitamin D Deficiency            Work on weight loss  Regular exercise  Chart documentation done in part with Dragon Voice recognition Software. Although reviewed after completion, some word and grammatical error may remain.    See Patient Instructions    Hema Lau is a 39 year old, presenting for the following health issues:  Hypertension and Hair/Scalp Problem      7/16/2024     2:38 PM   Additional Questions   Roomed by Tracy LOPEZ   Accompanied by self     Hair/Scalp Problem    History of Present Illness     Asthma:  He presents for follow up of asthma.  He has no cough, no wheezing, and no shortness of breath. He is not using a relief medication.  He does not miss any doses of his controller medication throughout the week. Patient is aware of the following triggers: none. The patient has not had a visit to the Emergency Room, Urgent Care or Hospital due to asthma since the last clinic visit.     Hypertension: He presents for follow up of hypertension.  He does not check blood pressure  regularly outside of the clinic. Outside blood pressures have been over 140/90. He does not follow a low salt diet.     He eats 2-3 servings of fruits and vegetables daily.He consumes 0 sweetened beverage(s) daily.He exercises with enough effort to increase his heart rate 9 or less minutes per day.  He exercises with enough effort to increase his heart rate 3 or less days per week. He is missing 1 dose(s) of medications per week.  He is not taking prescribed medications regularly due to remembering to  take.         Asthma Follow-Up    Was ACT completed today?  Yes        7/16/2024     3:01 PM   ACT Total Scores   ACT TOTAL SCORE (Goal Greater than or Equal to 20) 25   In the past 12 months, how many times did you visit the emergency room for your asthma without being admitted to the hospital? 0   In the past 12 months, how many times were you hospitalized overnight because of your asthma? 0        How many days per week do you miss taking your asthma controller medication?  I do not have an asthma controller medication  Please describe any recent triggers for your asthma: upper respiratory infections  Have you had any Emergency Room Visits, Urgent Care Visits, or Hospital Admissions since your last office visit?  No  Hair loss-patient reports having hair thinning and hair loss on both parietotemporal areas behind the ear  Denies scalp itching, dandruff  Duration of symptoms-for the past several months  Has no underlying history of thyroid disorder, anemia, vitamin deficiencies.            Review of Systems  CONSTITUTIONAL: NEGATIVE for fever, chills, change in weight  INTEGUMENTARY/SKIN: As above  RESP: NEGATIVE for significant cough or SOB  RESP: History of asthma  CV: NEGATIVE for chest pain, palpitations or peripheral edema  GI: NEGATIVE for nausea, abdominal pain, heartburn, or change in bowel habits  MUSCULOSKELETAL: NEGATIVE for significant arthralgias or myalgia  NEURO: NEGATIVE for weakness, dizziness or paresthesias  ENDOCRINE: NEGATIVE for temperature intolerance, skin/hair changes  HEME/ALLERGY/IMMUNE: NEGATIVE for bleeding problems  PSYCHIATRIC: History of schizoaffective disorder      Objective    /80 (BP Location: Right arm, Patient Position: Sitting, Cuff Size: Adult Large)   Pulse 89   Temp 98.7  F (37.1  C) (Oral)   Resp 12   Wt 105.1 kg (231 lb 11.2 oz)   SpO2 97%   BMI 31.42 kg/m    Body mass index is 31.42 kg/m .  Physical Exam   GENERAL: alert and no distress  RESP: lungs clear to  auscultation - no rales, rhonchi or wheezes  CV: regular rate and rhythm, normal S1 S2, no S3 or S4, no murmur, click or rub, no peripheral edema   MS: no gross musculoskeletal defects noted, no edema  SKIN: no suspicious lesions or rashes  PSYCH: mentation appears normal, affect normal/bright            Signed Electronically by: Erik Newberry MD

## 2024-07-17 ENCOUNTER — TELEPHONE (OUTPATIENT)
Dept: DERMATOLOGY | Facility: CLINIC | Age: 39
End: 2024-07-17
Payer: MEDICARE

## 2024-07-17 LAB
FERRITIN SERPL-MCNC: 202 NG/ML (ref 31–409)
TSH SERPL DL<=0.005 MIU/L-ACNC: 0.7 UIU/ML (ref 0.3–4.2)
VIT B12 SERPL-MCNC: 440 PG/ML (ref 232–1245)
VIT D+METAB SERPL-MCNC: 33 NG/ML (ref 20–50)

## 2024-07-17 NOTE — RESULT ENCOUNTER NOTE
Antoni Lau,  Your recent labs showed normal test results for thyroid functions, iron levels, vitamin D and B12.  This is good and reassuring.   Let me know if you have any questions. Take care.  Erik Newberry MD

## 2024-07-17 NOTE — TELEPHONE ENCOUNTER
Called the patient and made an appointment for new hair loss. Gave the patient the number to the schedule line to call and see if any other place has anything sooner then [facility]. [facility] EMT

## 2024-07-17 NOTE — TELEPHONE ENCOUNTER
"[facility] Call Center    Phone Message    May a detailed message be left on voicemail: yes     Reason for Call: Other: Patient would like to schedule \"New Hairloss\" visit in [facility] - unable to pull up template to schedule. Sending encounter message for review and follow-up with Pt for scheduling. Thank you!     Action Taken: Message routed to:  Other: [facility]    Travel Screening: Not Applicable     Date of Service:                                                                      "

## 2024-08-09 NOTE — TELEPHONE ENCOUNTER
Patient scheduled for a sleep study and a follow-up with provider. CMA looked for a sooner appointment at both location that Carley Saul PA-C works at but there are none. Patient informed of this and that he is on a wait list for a sooner appointment. Patient stated understanding.  Amber Berman CMA

## 2024-08-12 ENCOUNTER — THERAPY VISIT (OUTPATIENT)
Dept: SLEEP MEDICINE | Facility: CLINIC | Age: 39
End: 2024-08-12
Payer: MEDICARE

## 2024-08-12 DIAGNOSIS — F25.9 SCHIZOAFFECTIVE DISORDER, UNSPECIFIED TYPE (H): ICD-10-CM

## 2024-08-12 DIAGNOSIS — E66.9 OBESITY (BMI 30-39.9): ICD-10-CM

## 2024-08-12 DIAGNOSIS — R06.81 WITNESSED APNEIC SPELLS: ICD-10-CM

## 2024-08-12 DIAGNOSIS — F41.1 GENERALIZED ANXIETY DISORDER: ICD-10-CM

## 2024-08-12 DIAGNOSIS — G47.21 DELAYED SLEEP PHASE SYNDROME: ICD-10-CM

## 2024-08-12 DIAGNOSIS — I10 ESSENTIAL HYPERTENSION: ICD-10-CM

## 2024-08-12 DIAGNOSIS — F51.04 CHRONIC INSOMNIA: ICD-10-CM

## 2024-08-12 PROCEDURE — 95810 POLYSOM 6/> YRS 4/> PARAM: CPT | Performed by: INTERNAL MEDICINE

## 2024-08-22 LAB — SLPCOMP: NORMAL

## 2024-08-22 NOTE — PROCEDURES
SLEEP STUDY INTERPRETATION  DIAGNOSTIC POLYSOMNOGRAPHY REPORT      Patient: JEWELS MURRAY  YOB: 1985  Study Date: 8/12/2024  MRN: [id number]  Referring Provider: Jose Mcgovern MD  Ordering Provider: Carley Saul PA- C    Indications for Polysomnography: The patient is a 39 year old Male who is 6' and weighs 227.0 lbs. His BMI is 31.1, [place] sleepiness scale 6/24 and neck circumference is 43 cm. A diagnostic polysomnogram was performed to evaluate for sleep apnea.    Polysomnogram Data: A full night polysomnogram recorded the standard physiologic parameters including EEG, EOG, EMG, ECG, nasal and oral airflow. Respiratory parameters of chest and abdominal movements were recorded with respiratory inductance plethysmography. Oxygen saturation was recorded by pulse oximetry. Hypopnea scoring rule used: 1B 4%.    Sleep Architecture: Fragmented sleep with reduced sleep efficiency. Percentage of REM was low.   The total recording time of the polysomnogram was 410.0 minutes. The total sleep time was 210.5 minutes. Sleep latency was increased at 77.5 minutes with the use of a sleep aid (Eszopiclone 3 mg). REM latency was 197.0 minutes. Arousal index was increased at 30.2 arousals per hour. Sleep efficiency was decreased at 51.3%. Wake after sleep onset was 122.0 minutes. The patient spent 10.7% of total sleep time in Stage N1, 55.1% in Stage N2, 28.0% in Stage N3, and 6.2% in REM. Time in REM supine was 5.0 minutes.    Respiration: Events ? The polysomnogram revealed a presence of - obstructive, 2 central, and - mixed apneas resulting in an apnea index of 0.6 events per hour. There were 16 obstructive hypopneas and - central hypopneas resulting in an obstructive hypopnea index of 4.6 and central hypopnea index of - events per hour. The combined apnea/hypopnea index was 5.1 events per hour (central apnea/hypopnea index was 0.6 events per hour). The REM AHI was 18.5 events per hour. The supine AHI was 8.9  events per hour. The RERA index was 2.9 events per hour.  The RDI was 8.0 events per hour.  Snoring - was reported as loud.  Respiratory rate and pattern - was notable for normal respiratory rate and pattern.  Sustained Sleep Associated Hypoventilation - Transcutaneous carbon dioxide monitoring was not used; however significant hypoventilation was not suggested by oximetry.  Sleep Associated Hypoxemia - (Greater than 5 minutes O2 sat at or below 88%) was not present. Baseline oxygen saturation was 93.6%. Lowest oxygen saturation was 86.0%. Time spent less than or equal to 88% was 0.6 minutes. Time spent less than or equal to 89% was 1.9 minutes.    Movement Activity: Negative for movement abnormalities.   Periodic Limb Activity - There were - PLMs during the entire study. The PLM index was - movements per hour. The PLM Arousal Index was - per hour.  REM EMG Activity - Excessive transient/sustained muscle activity was not present.  Nocturnal Behavior - Abnormal sleep related behaviors were not noted during/arising out of NREM / REM sleep.   Bruxism - None apparent.    Cardiac Summary: Sinus rhythm.   The average pulse rate was 70.4 bpm. The minimum pulse rate was 58.0 bpm while the maximum pulse rate was 100.0 bpm.  Arrhythmias were not noted.    Assessment:   This sleep study was limited by poor sleep efficiency and total sleep time of only 210 minutes. Percentage of REM was low and there was minimal supine REM sleep.   Marginal degree of sleep apnea was noted with sleep apnea events primarily occurring in supine sleep. Due to limitations of the test, underestimation of sleep apnea cannot be rule out.     Recommendations:  If treatment of mild obstructive sleep apnea is clinically indicated, following therapy options can be considered.   Patient may be a candidate for dental appliance through referral to Sleep Dentistry for the treatment of obstructive sleep apnea.   Positional therapy to avoid supine sleep can be a  consideration.   Clinical management of insomnia.   Positive airway pressure therapy is generally not indicated for borderline obstructive sleep apnea, unless clinically qualified due to excessive daytime sleepiness or another qualifying medical comorbidity. If indicated, auto titrating PAP therapy can be a consideration.   Suggest optimizing sleep schedule and avoiding sleep deprivation.  Weight management (if BMI > 30).    Diagnostic Codes:   Obstructive Sleep Apnea G47.33  Repetitive Intrusions Into Sleep F51.8    8/12/2024 [place] [facility] Sleep Study (227.0 lbs) - AHI 5.1, RDI 8.0, Supine AHI 8.9, REM AHI 18.5, Low O2 86.0%, Time Spent ?88% 0.6 minutes / Time Spent ?89% 1.9 minutes.     _____________________________________   Electronically Signed By: eY Villafana MD 08/21/2024

## 2024-08-23 ENCOUNTER — MYC MEDICAL ADVICE (OUTPATIENT)
Dept: SLEEP MEDICINE | Facility: CLINIC | Age: 39
End: 2024-08-23
Payer: MEDICARE

## 2024-08-23 DIAGNOSIS — F51.04 CHRONIC INSOMNIA: Primary | ICD-10-CM

## 2024-08-23 DIAGNOSIS — G47.33 OSA (OBSTRUCTIVE SLEEP APNEA): ICD-10-CM

## 2024-08-23 DIAGNOSIS — I10 ESSENTIAL HYPERTENSION: ICD-10-CM

## 2024-08-23 DIAGNOSIS — F41.1 GENERALIZED ANXIETY DISORDER: ICD-10-CM

## 2024-11-04 ENCOUNTER — VIRTUAL VISIT (OUTPATIENT)
Dept: SLEEP MEDICINE | Facility: CLINIC | Age: 39
End: 2024-11-04
Payer: MEDICARE

## 2024-11-04 VITALS — HEIGHT: 72 IN | WEIGHT: 225 LBS | BODY MASS INDEX: 30.48 KG/M2

## 2024-11-04 DIAGNOSIS — G47.33 OSA (OBSTRUCTIVE SLEEP APNEA): Primary | ICD-10-CM

## 2024-11-04 DIAGNOSIS — F51.04 CHRONIC INSOMNIA: ICD-10-CM

## 2024-11-04 DIAGNOSIS — G47.21 DELAYED SLEEP PHASE SYNDROME: ICD-10-CM

## 2024-11-04 PROCEDURE — 99215 OFFICE O/P EST HI 40 MIN: CPT | Mod: 95 | Performed by: PHYSICIAN ASSISTANT

## 2024-11-04 ASSESSMENT — SLEEP AND FATIGUE QUESTIONNAIRES
HOW LIKELY ARE YOU TO NOD OFF OR FALL ASLEEP WHEN YOU ARE A PASSENGER IN A CAR FOR AN HOUR WITHOUT A BREAK: WOULD NEVER DOZE
HOW LIKELY ARE YOU TO NOD OFF OR FALL ASLEEP IN A CAR, WHILE STOPPED FOR A FEW MINUTES IN TRAFFIC: WOULD NEVER DOZE
HOW LIKELY ARE YOU TO NOD OFF OR FALL ASLEEP WHILE SITTING QUIETLY AFTER LUNCH WITHOUT ALCOHOL: SLIGHT CHANCE OF DOZING
HOW LIKELY ARE YOU TO NOD OFF OR FALL ASLEEP WHILE WATCHING TV: SLIGHT CHANCE OF DOZING
HOW LIKELY ARE YOU TO NOD OFF OR FALL ASLEEP WHILE SITTING INACTIVE IN A PUBLIC PLACE: WOULD NEVER DOZE
HOW LIKELY ARE YOU TO NOD OFF OR FALL ASLEEP WHILE LYING DOWN TO REST IN THE AFTERNOON WHEN CIRCUMSTANCES PERMIT: SLIGHT CHANCE OF DOZING
HOW LIKELY ARE YOU TO NOD OFF OR FALL ASLEEP WHILE SITTING AND TALKING TO SOMEONE: WOULD NEVER DOZE
HOW LIKELY ARE YOU TO NOD OFF OR FALL ASLEEP WHILE SITTING AND READING: SLIGHT CHANCE OF DOZING

## 2024-11-04 ASSESSMENT — PAIN SCALES - GENERAL: PAINLEVEL_OUTOF10: NO PAIN (0)

## 2024-11-04 NOTE — PATIENT INSTRUCTIONS
Behavioral Sleep Medicine Program    [facility],  provides non-drug treatment for sleep problems as part of our multi-discipline sleep medicine program. Services offered include:    Cognitive-behavioral Therapies for Insomnia (CBT-I)  Management of Shift-work and Jet Lag Sleep Disorders  Management of Delayed, Advanced and Irregular Circadian Rhythm Sleep Disorders  Imagery Rehearsal Therapy (IRT) for Nightmare Disorder  Adaptation to PAP Therapy for Obstructive Sleep Apnea  Behavioral strategies to manage hypersomnia and fatigue    You have been referred for consultation with a sleep psychologist who specializes in behavioral sleep medicine and treatment of insomnia.  [facility] offers individualized telehealth services through our [facility] and online CBT-I.    Preparing for your Consultation    We recommmend you keep a Sleep Diary for at least a week prior to your visit. Complete the sleep diary each day first thing after you get up by answering a few key questions about your sleep using our convenient mobile katerina or paper sleep diary.  Your answers should be based on your recall of the past 24 hours.  Avoid watching the clock or recording data during the night.     Insomnia  Katerina    The Insomnia  mobile katerian  is a convenient way to keep track of your sleep prior to your sleep consultation.  Simply download the free katerina on your Apple or Android phone and record your information each morning.  The katerina includes training, self-assessment, and sleep schedule recommendations.  Prior to your consultation we recommend you use only the sleep diary function. You can e-mail yourself a copy of your sleep diary data by going to the Settings section and using the [place] User Data function.  During your consultation your provider will review the data with you.          [facility] Sleep Diary    You  can also track your sleep using the [facility] paper sleep diary.  You can upload your sleep diary and send it via a HyperQuest message, fax it to [phone], or have it with you at the time of your consultation.            CBT-I:  Frequently Asked Questions    What is CBT-I?    Cognitive Behavioral Therapy for Insomnia, also known as CBT-I, is a highly effective non-drug treatment for insomnia. The American College of Physicians recommends CBT-I as the first treatment for chronic insomnia.  Research has shown CBT-I to be safer and more effective long term than sleeping pills.    What does CBT-I involve?     CBT-I targets behaviors that lead to chronic insomnia:  Habits that weaken the bed as a cue for sleep  Habits that weaken your body's sleep drive and sleep/wake clock   Unhelpful sleep thoughts that increase sleep-related worry and arousal.    The process involves 3-6 telehealth visits that guide you to implement proven strategies to get a better night's sleep.    People often see improvement in their sleep within a few weeks. Research shows if you keep practicing the skills you learn your sleep is likely to continue to improve 6-12 months after treatment.    Does this program prescribe or manage sleep medication?    No.  Your prescribing provider is responsible to assist you in managing your sleep medications.  Some people choose to stop using sleep medication prior to or during CBT-I.  Our program can work with your prescribing provider to help reduce or eliminate use of sleep medications.     Getting Started Today!    If you haven't already done so, we recommend you consider making the following changes to your sleep habits prior to your sleep consultation:     Reduce your consumption of caffeine and alcohol.  Both can disrupt sleep and make strengthening your sleep more difficult.  Specifically:    - Avoid caffeine within 6 hours of bedtime   - No more than 3 caffeinated beverages per day (e.g. 8 oz.  "cup coffee or 12 oz. cup soda)            - No alcohol within 3 hours of bedtime    Make sure your bedroom is quiet, comfortable and dark.  Noise, light and an uncomfortable sleep space can harm your sleep.      Keep the same sleep schedule 7 days a week.unless you do shift work.      Our Online CBT-I Program    If you want to get started today, research indicates that online CBT-I can be effective for some individuals. These programs requires comfort with joya-based or online learning.  However, digital CBT-I programs are not for everyone.  Contraindications include:    Seizure disorders,   Bipolar disorder,   Unstable medical or mental health conditions,   Frailty or risk of falling  Pregnancy    You should consult a sleep specialist before using these resources if you have:    Sleep Apnea  Restless Leg Syndrome  Sleep Walking  REM behavior disorder  Night Terrors  Excessive Daytime Sleepiness  Are engaged in shift work  Use prescription sleep medication    Click on the link below to get started today:                                  [email or web address]             Once you are registered you can share your sleep log data with [facility] where your health provider will be able to review your sleep log and progress.  Once you begin the program, go to the left side navigation bar and click on the  share sleep log  button:                                        This takes you to the next page where you enter the provider code [email or web address] and click Locate:                 This brings you to the verification page where you should see [facility] identified as your provider                                                                           By clicking \"Submit\" your sleep log data will be sent to our secure [facility] portal for review by you provider.     For Help Contact Customer Care At:    [email or web address]  If your sleep provider recommends online " CBT-I for you , the cost for an entire 6-week program is $40.    To get started, copy and paste the link below which will take you to the landing page to register:                              Self-help Workbooks for Insomnia    If you have found self-help books useful in the past, you may want to consider reading one of the following books prior to your consultation:    Say Nicole to Insomnia: The Six-Week, Drug-Free Program Developed at [facility].  Parveen Garces MD. Available in paperback, Carmela, and audiobook.    Overcoming Insomnia: A Cognitive-Behavioral Therapy Approach, Workbook.  Pacheoc Cervantes, PhD  and Maribell Mccormack, PhD.  Available in paperback and Carmela.    Quiet Your Mind and Get to Sleep: Solutions to Insomnia for Those with Depression, Anxiety, or Chronic Pain.  Kamryn Rock, PhD and Maribell Mccormack, PhD.  Available in paperback and Carmela

## 2024-11-04 NOTE — PROGRESS NOTES
"Virtual Visit Details    Type of service:  Video Visit   Video Start Time: 2:27PM  Video End Time:3:00PM  Originating Location (pt. Location): Home    Distant Location (provider location):  Off-site  Platform used for Video Visit: [facility]   Outpatient Sleep Medicine  Nov 4, 2024       Name: Sid Zarco MRN# [id number]   Age: 39 year old YOB: 1985            Assessment and Plan:   1. KELLEY (obstructive sleep apnea) (Primary)  2. Chronic insomnia  3. Delayed sleep phase syndrome    Patient presents to clinic today for CPAP follow-up visit.  He has used 7 days since he picked up the machine, feels the few days he has tried CPAP he has been sleeping worse and it is not helpful though also admits \"I did not give it a full try\".  Discussed if we elect to continue CPAP therapy he will have to use it every single night going forward for minimum of 4 hours to ensure he meets compliance.  Discussed if he does not believe he will be able to meet compliance goals and/or does not see this is a good long-term treatment option for him another option would be returning machine to [facility] so does not get charged for noncompliance.  Patient admits to low motivation and does not believe he would be able to meet compliance so agreed to have him return the machine to [facility] at his earliest ability.  His sleep apnea was extremely mild/barely positive so treatment with CPAP was very optional to begin with.  I think his insomnia/sleep disruption is much more linked to his delayed sleep phase and mood disorders than this borderline KELLEY diagnosis.  We reviewed how low-dose melatonin in the evening and bright light therapy/SAD lamp in the morning can help with his delayed sleep phase and help wake in the morning.  Historically he did well with a SAD lamp in AM but it broke and he never replaced it.  He will look into purchasing a new one and discussed the option of having " "him see Dr. Brush again since he had good benefit when he saw him in the past, he was open to this, recalls he just got busy and forgot to schedule follow-up and fell out of habit with behavior changes so new referral was placed today.  - Behavioral Sleep Medicine  Referral; Future       Chief Complaint      Chief Complaint   Patient presents with    RECHECK          History of Present Illness:     Sid Zarco is a 39-year-old male with obesity, JODI, schizoaffective disorder, chronic insomnia, asthma, HTN who I initially saw back in June 2024 for evaluation of a chronic multifactorial insomnia.  Previously followed both my colleagues Bennett Goltz and by Dr. Brush in sleep psychology.    At his visit with me in June predominant concern was underlying KELLEY.  He had a prior normal PSG completed at [facility] 2/18/2019 (231#, BMI 31.3) that showed evidence of mild to moderate snoring but no significant sleep apnea with AHI 4.3 though RDI was 7.1.  Mean SpO2 94% with minimum 87%.  No abnormal movements or behaviors noted during sleep.  All sleep stages seen with normal sleep efficiency 89%.    Patient concerned about progression of his sleep apnea and orders for updated PSG were placed.  PSG completed 8/12/2024 (227#, BMI 31.1) showed AHI 5.1, RDI 8.0, Supine AHI 8.9, REM AHI 18.5, Low O2 86.0%, Time Spent <=88% 0.6 minutes / Time Spent <=89% 1.9 minutes.  Study was limited by poor sleep efficiency and total sleep time of only 210 minutes, all stages seen but minimal REM.  No abnormal movements or behaviors.    Though barely positive orders were placed to start on CPAP and he was set up with his ResMed AirSense 11 auto CPAP 5-15 cm H2O on 8/30/2024.    Presents to my clinic today for insurance required compliance visit on CPAP therapy.  Has both using and visit requirement.    Patient has used his CPAP 7 days in total since he picked it up. States \"when I wear it it's harder to get to sleep and harder " "to wake up and I don't like sleep deprivation or do well with it overall\". Discussed importance of compliance and patient states \"I gave it a shot but I didn't give it the full try I guess but I am not sure how consistent I can be\".    Overall, the patient rates their experience with PAP as 3 (0 poor, 10 great). Patient is using a nasal pillow mask. The mask is comfortable. The mask is not leaking. They are not snoring with the mask on. They are not having gasp arousals.  Settings were comfortable when used.     Bedtime is typically 2-2:30 AM. Usually it takes about 60 minutes to fall asleep with the mask on. Wake time is typically 10-10:30 AM.  Estimates he is sleeping 6-9 hours per night.     ResMed Auto-PAP 5-15 cmH2O download (9/6/24-10/23/24):  6 total days of use. 42 nonuse days. 2 days with >4 hours use.  Average use 2 hours 39 minutes per day. Median Leak 23.4 L/min. 95%ile Leak 38.0 L/min. CPAP 95% pressure 5.8cm. AHI 0.3    SCALES:   INSOMNIA: Insomnia Severity Score: 19   SLEEPINESS: Herlong Sleepiness Score: 4    Past medical/surgical history, family history, social history, medications and allergies were reviewed.           Physical Examination:   Ht 1.829 m (6')   Wt 102.1 kg (225 lb)   BMI 30.52 kg/m    General appearance: Awake, alert, cooperative. Well groomed. Sitting comfortably in chair. In no apparent distress.  HEENT: Head: Normocephalic, atraumatic. Eyes:Conjunctiva clear. Sclera normal. Nose: External appearance without deformity.   Pulmonary:  Able to speak easily in full sentences. No cough or wheeze.   Skin:  No rashes or significant lesions on visible skin.   Neurologic: Alert, oriented x3.   Psychiatric: Mood euthymic. Affect congruent with full range and intensity.      CC:  Erik Newberry PA-C  Nov 4, 2024     [facility]  [address] , [place] [address]     [facility]  [address], [place] [address]    Chart documentation was completed, in part, with [employer] voice-recognition software. Even though reviewed, some grammatical, spelling, and word errors may remain.    51 minutes spent on day of encounter doing chart review, history and exam, documentation, and further activities as noted above

## 2024-11-08 ENCOUNTER — IMMUNIZATION (OUTPATIENT)
Dept: FAMILY MEDICINE | Facility: CLINIC | Age: 39
End: 2024-11-08
Payer: MEDICARE

## 2024-11-08 DIAGNOSIS — Z23 ENCOUNTER FOR IMMUNIZATION: Primary | ICD-10-CM

## 2024-11-08 PROCEDURE — 90480 ADMN SARSCOV2 VAC 1/ONLY CMP: CPT

## 2024-11-08 PROCEDURE — 99207 PR NO CHARGE NURSE ONLY: CPT

## 2024-11-08 PROCEDURE — 91320 SARSCV2 VAC 30MCG TRS-SUC IM: CPT

## 2024-11-08 NOTE — PROGRESS NOTES
Prior to immunization administration, verified patients identity using patient s name and date of birth. Please see Immunization Activity for additional information.     Is the patient's temperature normal (100.5 or less)? Yes     Patient MEETS CRITERIA. PROCEED with vaccine administration.      Patient instructed to remain in clinic for 15 minutes afterwards, and to report any adverse reactions.      Link to Ancillary Visit Immunization Standing Orders SmartSet     Screening performed by Kylee Vázquez CMA on 11/8/2024 at 1:33 PM.

## 2025-04-20 ENCOUNTER — HEALTH MAINTENANCE LETTER (OUTPATIENT)
Age: 40
End: 2025-04-20

## 2025-07-28 SDOH — HEALTH STABILITY: PHYSICAL HEALTH: ON AVERAGE, HOW MANY MINUTES DO YOU ENGAGE IN EXERCISE AT THIS LEVEL?: 50 MIN

## 2025-07-28 SDOH — HEALTH STABILITY: PHYSICAL HEALTH: ON AVERAGE, HOW MANY DAYS PER WEEK DO YOU ENGAGE IN MODERATE TO STRENUOUS EXERCISE (LIKE A BRISK WALK)?: 3 DAYS

## 2025-07-28 ASSESSMENT — SOCIAL DETERMINANTS OF HEALTH (SDOH): HOW OFTEN DO YOU GET TOGETHER WITH FRIENDS OR RELATIVES?: PATIENT DECLINED

## 2025-07-28 NOTE — COMMUNITY RESOURCES LIST (ENGLISH)
Food  Food Helpline   Program Provider: [employer]  Program Website : https://www.hungersolutions.org/programs/[place]-food-helpline/  Next Steps: get more info https://www.hungersolutions.org/programs/[place]-food-helpline/how-can-we-help-you/    Program Locations:   Address:  [address] [place] [address]   Distance:  16.15 mi     Hours:   Monday: 8:00 AM - 5:00 PM   Tuesday: 8:00 AM - 5:00 PM   Wednesday: 8:00 AM - 5:00 PM   Thursday: 8:00 AM - 5:00 PM   Friday: 8:00 AM - 5:00 PM   Saturday: CLOSED   Sunday: CLOSED     Reduced Cost Groceries   Program Provider: [employer]are For All  Program Website : https://Decision Curve.org/groceries/fare-for-all/  Next Steps: Go to https://[employer].org/groceries/fare-for-all/schedule/    Program Locations:   Address:  [address] [place] [address]   Distance:  16.15 mi     Hours:   Monday: 9:00 AM - 5:00 PM   Tuesday: 9:00 AM - 5:00 PM   Wednesday: 9:00 AM - 5:00 PM   Thursday: 9:00 AM - 5:00 PM   Friday: 9:00 AM - 5:00 PM   Saturday: CLOSED   Sunday: CLOSED     Supplemental Nutrition Assistance Program (SNAP) Outreach   Program Provider: [facility]  Program Website : https://www.TeklatecharFeedHenryt.org/who--how-we-help/services-and-programs/programs/snap-outreach.html#.ZuzxITAAv9o  Program Phone Number: 956.936.8976  Next Steps: Go to https://www.Tek[employer]FeedHenryt.org/who--how-we-help/services-and-programs/programs/snap-outreach.html#.MqpeCFRVq6f    Program Locations:   Address:  [address] [place] [address]   Distance:  26.14 mi   Office Phone Number: [phone]    Hours:   Monday: 8:00 AM - 4:30 PM   Tuesday: 8:00 AM - 4:30 PM   Wednesday: 8:00 AM - 4:30 PM   Thursday: 8:00 AM - 4:30 PM   Friday: 8:00 AM - 4:30 PM   Saturday: CLOSED   Sunday: CLOSED

## 2025-07-29 ENCOUNTER — OFFICE VISIT (OUTPATIENT)
Dept: FAMILY MEDICINE | Facility: CLINIC | Age: 40
End: 2025-07-29
Payer: MEDICARE

## 2025-07-29 VITALS
BODY MASS INDEX: 31.61 KG/M2 | OXYGEN SATURATION: 96 % | HEIGHT: 72 IN | HEART RATE: 87 BPM | RESPIRATION RATE: 16 BRPM | WEIGHT: 233.4 LBS | SYSTOLIC BLOOD PRESSURE: 126 MMHG | TEMPERATURE: 98.7 F | DIASTOLIC BLOOD PRESSURE: 84 MMHG

## 2025-07-29 DIAGNOSIS — F51.04 CHRONIC INSOMNIA: ICD-10-CM

## 2025-07-29 DIAGNOSIS — H61.23 BILATERAL IMPACTED CERUMEN: ICD-10-CM

## 2025-07-29 DIAGNOSIS — F25.9 SCHIZOAFFECTIVE DISORDER, UNSPECIFIED TYPE (H): ICD-10-CM

## 2025-07-29 DIAGNOSIS — E78.2 MIXED HYPERLIPIDEMIA: ICD-10-CM

## 2025-07-29 DIAGNOSIS — J45.20 MILD INTERMITTENT ASTHMA WITHOUT COMPLICATION: ICD-10-CM

## 2025-07-29 DIAGNOSIS — Z00.00 ENCOUNTER FOR MEDICARE ANNUAL WELLNESS EXAM: Primary | ICD-10-CM

## 2025-07-29 PROCEDURE — G0439 PPPS, SUBSEQ VISIT: HCPCS

## 2025-07-29 PROCEDURE — 69209 REMOVE IMPACTED EAR WAX UNI: CPT | Mod: 50

## 2025-07-29 PROCEDURE — 1126F AMNT PAIN NOTED NONE PRSNT: CPT

## 2025-07-29 PROCEDURE — 3074F SYST BP LT 130 MM HG: CPT

## 2025-07-29 PROCEDURE — 3050F LDL-C >= 130 MG/DL: CPT

## 2025-07-29 PROCEDURE — 3079F DIAST BP 80-89 MM HG: CPT

## 2025-07-29 PROCEDURE — 99214 OFFICE O/P EST MOD 30 MIN: CPT | Mod: 25

## 2025-07-29 RX ORDER — DOXEPIN HYDROCHLORIDE 25 MG/1
25 CAPSULE ORAL AT BEDTIME
COMMUNITY
Start: 2025-07-03

## 2025-07-29 RX ORDER — ALBUTEROL SULFATE 90 UG/1
INHALANT RESPIRATORY (INHALATION)
Qty: 18 G | Refills: 3 | Status: SHIPPED | OUTPATIENT
Start: 2025-07-29

## 2025-07-29 ASSESSMENT — PAIN SCALES - GENERAL: PAINLEVEL_OUTOF10: NO PAIN (0)

## 2025-07-29 NOTE — LETTER
My Asthma Action Plan    Name: Sid Zacro   YOB: 1985  Date: 7/29/2025   My doctor: yMah Gracia PA-C   My clinic: [facility]        My Rescue Medicine: Albuterol (Proair/Ventolin/Proventil HFA) 2-4 puffs EVERY 4 HOURS as needed. Use a spacer if recommended by your provider.   My Asthma Severity:   Intermittent / Exercise Induced  Know your asthma triggers: smoke  upper respiratory infections          GREEN ZONE   Good Control  I feel good  No cough or wheeze  Can work, sleep and play without asthma symptoms       Take your asthma control medicine every day.     If exercise triggers your asthma, take your rescue medication  15 minutes before exercise or sports, and  During exercise if you have asthma symptoms  Spacer to use with inhaler: If you have a spacer, make sure to use it with your inhaler             YELLOW ZONE Getting Worse  I have ANY of these:  I do not feel good  Cough or wheeze  Chest feels tight  Wake up at night   Keep taking your Green Zone medications  Start taking your rescue medicine:  every 20 minutes for up to 1 hour. Then every 4 hours for 24-48 hours.  If you stay in the Yellow Zone for more than 12-24 hours, contact your doctor.  If you do not return to the Green Zone in 12-24 hours or you get worse, start taking your oral steroid medicine if prescribed by your provider.           RED ZONE Medical Alert - Get Help  I have ANY of these:  I feel awful  Medicine is not helping  Breathing getting harder  Trouble walking or talking  Nose opens wide to breathe       Take your rescue medicine NOW  If your provider has prescribed an oral steroid medicine, start taking it NOW  Call your doctor NOW  If you are still in the Red Zone after 20 minutes and you have not reached your doctor:  Take your rescue medicine again and  Call 911 or go to the emergency room right away    See your regular doctor within 2 weeks of an Emergency Room or Urgent Care visit for  follow-up treatment.          Annual Reminders:  Meet with Asthma Educator,  Flu Shot in the Fall, consider Pneumonia Vaccination for patients with asthma (aged 19 and older).    Pharmacy:    [id number] [place], [place] - [address] AT [id number] - [place], [place] - [address]  [id number] - [place], [place] - [address] [facility]    Electronically signed by Myah Gracia PA-C   Date: 07/29/25                    Asthma Triggers  How To Control Things That Make Your Asthma Worse    Triggers are things that make your asthma worse.  Look at the list below to help you find your triggers and   what you can do about them. You can help prevent asthma flare-ups by staying away from your triggers.      Trigger                                                          What you can do   Cigarette Smoke  Tobacco smoke can make asthma worse. Do not allow smoking in your home, car or around you.  Be sure no one smokes at a child s day care or school.  If you smoke, ask your health care provider for ways to help you quit.  Ask family members to quit too.  Ask your health care provider for a referral to Quit Plan to help you quit smoking, or call 2-663-466-PLAN.     Colds, Flu, Bronchitis  These are common triggers of asthma. Wash your hands often.  Don t touch your eyes, nose or mouth.  Get a flu shot every year.     Dust Mites  These are tiny bugs that live in cloth or carpet. They are too small to see. Wash sheets and blankets in hot water every week.   Encase pillows and mattress in dust mite proof covers.  Avoid having carpet if you can. If you have carpet, vacuum weekly.   Use a dust mask and HEPA vacuum.   Pollen and Outdoor Mold  Some people are allergic to trees, grass, or weed pollen, or molds. Try to keep your windows closed.  Limit time out doors when pollen count is high.   Ask you health care provider about taking medicine during  allergy season.     Animal Dander  Some people are allergic to skin flakes, urine or saliva from pets with fur or feathers. Keep pets with fur or feathers out of your home.    If you can t keep the pet outdoors, then keep the pet out of your bedroom.  Keep the bedroom door closed.  Keep pets off cloth furniture and away from stuffed toys.     Mice, Rats, and Cockroaches  Some people are allergic to the waste from these pests.   Cover food and garbage.  Clean up spills and food crumbs.  Store grease in the refrigerator.   Keep food out of the bedroom.   Indoor Mold  This can be a trigger if your home has high moisture. Fix leaking faucets, pipes, or other sources of water.   Clean moldy surfaces.  Dehumidify basement if it is damp and smelly.   Smoke, Strong Odors, and Sprays  These can reduce air quality. Stay away from strong odors and sprays, such as perfume, powder, hair spray, paints, smoke incense, paint, cleaning products, candles and new carpet.   Exercise or Sports  Some people with asthma have this trigger. Be active!  Ask your doctor about taking medicine before sports or exercise to prevent symptoms.    Warm up for 5-10 minutes before and after sports or exercise.     Other Triggers of Asthma  Cold air:  Cover your nose and mouth with a scarf.  Sometimes laughing or crying can be a trigger.  Some medicines and food can trigger asthma.

## 2025-07-29 NOTE — PATIENT INSTRUCTIONS
"Schedule fasting lab appointment (no food for 8-12 hours) to recheck cholesterol panel in 4-6 months    Consider getting pneumonia vaccine. If interested, schedule nurse visit  Refills sent  Ear wash today    Monitor lymph node, if no improvement in 1 month send update via [employer]    Patient Education   Preventive Care Advice   This is general advice we often give to help people stay healthy. Your care team may have specific advice just for you. Please talk to your care team about your own preventive care needs.  Lifestyle  Exercise at least 150 minutes each week (30 minutes a day, 5 days a week).  Do muscle strengthening activities 2 days a week. These help control your weight and prevent disease.  No smoking.  Wear sunscreen to prevent skin cancer.  Take time with family and friends.  Have your home tested for radon every 2 to 5 years. Radon is a colorless, odorless gas that can harm your lungs. To learn more, go to www.health.[facility].[place].us and search for \"Radon in Homes.\"  Keep guns unloaded and locked up in a safe place like a safe or gun vault, or, use a gun lock and hide the keys. Always lock away bullets separately. To learn more, visit [employer].[place].gov and search for \"safe gun storage.\"  Nutrition  Eat 5 or more servings of fruits and vegetables each day.  Try wheat bread, brown rice and whole grain pasta (instead of white bread, rice, and pasta).  Get enough calcium and vitamin D. Check the label on foods and aim for 100% of the RDA (recommended daily allowance).  Regular exams  Have a dental exam and cleaning every 6 months.  Older adults: Ask your care team how often to have memory testing.  See your health care team every year to talk about:  Any changes in your health.  Any medicines your care team has prescribed.  Preventive care, family planning, and ways to prevent chronic diseases.  Shots (vaccines)   HPV shots (up to age 26), if you've never had them before.  Hepatitis B shots (up to age 59), if you've " never had them before.  COVID-19 shot: Get this shot when it's due.  Flu shot: Get a flu shot every year.  Tetanus shot: Get a tetanus shot every 10 years.  Pneumococcal, hepatitis A, and RSV shots: Ask your care team if you need these based on your risk.  Shingles shot (for age 50 and up).  General health tests  Diabetes screening:  Starting at age 35, Get screened for diabetes at least every 3 years.  If you are younger than age 35, ask your care team if you should be screened for diabetes.  Cholesterol test: At age 39, start having a cholesterol test every 5 years, or more often if advised.  Bone density scan (DEXA): At age 50, ask your care team if you should have this scan for osteoporosis (brittle bones).  Hepatitis C: Get tested at least once in your life.  Abdominal aortic aneurysm screening: Talk to your doctor about having this screening if you:  Have ever smoked; and  Are biologically male; and  Are between the ages of 65 and 75.  STIs (sexually transmitted infections)  Before age 24: Ask your care team if you should be screened for STIs.  After age 24: Get screened for STIs if you're at risk. You are at risk for STIs (including HIV) if:  You are sexually active with more than one person.  You don't use condoms every time.  You or a partner was diagnosed with a sexually transmitted infection.  If you are at risk for HIV, ask about PrEP medicine to prevent HIV.  Get tested for HIV at least once in your life, whether you are at risk for HIV or not.  Cancer screening tests  Cervical cancer screening: If you have a cervix, begin getting regular cervical cancer screening tests at age 21. Most people who have regular screenings with normal results can stop after age 65. Talk about this with your provider.  Breast cancer scan (mammogram): If you've ever had breasts, begin having regular mammograms starting at age 40. This is a scan to check for breast cancer.  Colon cancer screening: It is important to start  screening for colon cancer at age 45.  Have a colonoscopy test every 10 years (or more often if you're at risk) Or, ask your provider about stool tests like a FIT test every year or Cologuard test every 3 years.  To learn more about your testing options, visit: www.[employer]/824419.pdf.  For help making a decision, visit: [email or web address].  Prostate cancer screening test: If you have a prostate and are age 55 to 69, ask your provider if you would benefit from a yearly prostate cancer screening test.  Lung cancer screening: If you are a current or former smoker age 50 to 80, ask your care team if ongoing lung cancer screenings are right for you.    For informational purposes only. Not to replace the advice of your health care provider. Copyright   2023 [employer]. All rights reserved. Clinically reviewed by the [facility] Transitions Program. Live[employer] [id number] - REV 6/25.  Hearing Loss: Care Instructions  Overview     Hearing loss is a sudden or slow decrease in how well you hear. It can range from slight to profound. Permanent hearing loss can occur with aging. It also can happen when you are exposed long-term to loud noise. Examples include listening to loud music, riding motorcycles, or being around other loud machines.  Hearing loss can affect your work and home life. It can make you feel lonely or depressed. You may feel that you have lost your independence. But hearing aids and other devices can help you hear better and feel connected to others.  Follow-up care is a key part of your treatment and safety. Be sure to make and go to all appointments, and call your doctor if you are having problems. It's also a good idea to know your test results and keep a list of the medicines you take.  How can you care for yourself at home?  Avoid loud noises whenever possible. This helps keep your hearing from getting worse.  Always wear hearing protection around loud noises.  Wear a hearing aid as  "directed.  A professional can help you pick a hearing aid that will work best for you.  You can also get hearing aids over the counter for mild to moderate hearing loss.  Have hearing tests as your doctor suggests. They can show whether your hearing has changed. Your hearing aid may need to be adjusted.  Use other devices as needed. These may include:  Telephone amplifiers and hearing aids that can connect to a television, stereo, radio, or microphone.  Devices that use lights or vibrations. These alert you to the doorbell, a ringing telephone, or a baby monitor.  Television closed-captioning. This shows the words at the bottom of the screen. Most new TVs can do this.  TTY (text telephone). This lets you type messages back and forth on the telephone instead of talking or listening. These devices are also called TDD. When messages are typed on the keyboard, they are sent over the phone line to a receiving TTY. The message is shown on a monitor.  Use text messaging, social media, and email if it is hard for you to communicate by telephone.  Try to learn a listening technique called speechreading. It is not lipreading. You pay attention to people's gestures, expressions, posture, and tone of voice. These clues can help you understand what a person is saying. Face the person you are talking to, and have them face you. Make sure the lighting is good. You need to see the other person's face clearly.  Think about counseling if you need help to adjust to your hearing loss.  When should you call for help?  Watch closely for changes in your health, and be sure to contact your doctor if:    You think your hearing is getting worse.     You have new symptoms, such as dizziness or nausea.   Where can you learn more?  Go to https://www.[employer].net/patiented  Enter R798 in the search box to learn more about \"Hearing Loss: Care Instructions.\"  Current as of: October 27, 2024  Content Version: 14.5 2024-2025 [employer].   Care instructions adapted under license by your healthcare professional. If you have questions about a medical condition or this instruction, always ask your healthcare professional. [employer] disclaims any warranty or liability for your use of this information.    Learning About Stress  What is stress?     Stress is your body's response to a hard situation. Your body can have a physical, emotional, or mental response. Stress is a fact of life for most people, and it affects everyone differently. What causes stress for you may not be stressful for someone else.  A lot of things can cause stress. You may feel stress when you go on a job interview, take a test, or run a race. This kind of short-term stress is normal and even useful. It can help you if you need to work hard or react quickly. For example, stress can help you finish an important job on time.  Long-term stress is caused by ongoing stressful situations or events. Examples of long-term stress include long-term health problems, ongoing problems at work, or conflicts in your family. Long-term stress can harm your health.  How does stress affect your health?  When you are stressed, your body responds as though you are in danger. It makes hormones that speed up your heart, make you breathe faster, and give you a burst of energy. This is called the fight-or-flight stress response. If the stress is over quickly, your body goes back to normal and no harm is done.  But if stress happens too often or lasts too long, it can have bad effects. Long-term stress can make you more likely to get sick, and it can make symptoms of some diseases worse. If you tense up when you are stressed, you may develop neck, shoulder, or low back pain. Stress is linked to high blood pressure and heart disease.  Stress also harms your emotional health. It can make you wright, tense, or depressed. Your relationships may suffer, and you may not do well at work or  school.  What can you do to manage stress?  You can try these things to help manage stress:   Do something active. Exercise or activity can help reduce stress. Walking is a great way to get started. Even everyday activities such as housecleaning or yard work can help.  Try yoga or stepan chi. These techniques combine exercise and meditation. You may need some training at first to learn them.  Do something you enjoy. For example, listen to music or go to a movie. Practice your hobby or do volunteer work.  Meditate. This can help you relax, because you are not worrying about what happened before or what may happen in the future.  Do guided imagery. Imagine yourself in any setting that helps you feel calm. You can use online videos, books, or a teacher to guide you.  Do breathing exercises. For example:  From a standing position, bend forward from the waist with your knees slightly bent. Let your arms dangle close to the floor.  Breathe in slowly and deeply as you return to a standing position. Roll up slowly and lift your head last.  Hold your breath for just a few seconds in the standing position.  Breathe out slowly and bend forward from the waist.  Let your feelings out. Talk, laugh, cry, and express anger when you need to. Talking with supportive friends or family, a counselor, or a real leader about your feelings is a healthy way to relieve stress. Avoid discussing your feelings with people who make you feel worse.  Write. It may help to write about things that are bothering you. This helps you find out how much stress you feel and what is causing it. When you know this, you can find better ways to cope.  What can you do to prevent stress?  You might try some of these things to help prevent stress:  Manage your time. This helps you find time to do the things you want and need to do.  Get enough sleep. Your body recovers from the stresses of the day while you are sleeping.  Get support. Your family, friends, and  "community can make a difference in how you experience stress.  Limit your news feed. Avoid or limit time on social media or news that may make you feel stressed.  Do something active. Exercise or activity can help reduce stress. Walking is a great way to get started.  Where can you learn more?  Go to https://www.[employer].net/patiented  Enter N032 in the search box to learn more about \"Learning About Stress.\"  Current as of: October 24, 2024  Content Version: 14.5 2024-2025 [employer].   Care instructions adapted under license by your healthcare professional. If you have questions about a medical condition or this instruction, always ask your healthcare professional. [employer] disclaims any warranty or liability for your use of this information.    9 Ways to Cut Back on Drinking  Maybe you've found yourself drinking more alcohol than you'd prefer. If you want to cut back, here are some ideas to try.    Think before you drink.  Do you really want a drink, or is it just a habit? If you're used to having a drink at a certain time, try doing something else then.     Look for substitutes.  Find some no-alcohol drinks that you enjoy, like flavored seltzer water, tea with honey, or tonic with a slice of lime. Or try alcohol-free beer or \"virgin\" cocktails (without the alcohol).     Drink more water.  Use water to quench your thirst. Drink a glass of water before you have any alcohol. Have another glass along with every drink or between drinks.     Shrink your drink.  For example, have a bottle of beer instead of a pint. Use a smaller glass for wine. Choose drinks with lower alcohol content (ABV%). Or use less liquor and more mixer in cocktails.     Slow down.  It's easy to drink quickly and without thinking about it. Pay attention, and make each drink last longer.     Do the math.  Total up how much you spend on alcohol each month. How much is that a year? If you cut back, what could you do " "with the money you save?     Take a break.  Choose a day or two each week when you won't drink at all. Notice how you feel on those days, physically and emotionally. How did you sleep? Do you feel better? Over time, add more break days.     Count calories.  Would you like to lose some weight? For some people that's a good motivator for cutting back. Figure out how many calories are in each drink. How many does that add up to in a day? In a week? In a month?     Practice saying no.  Be ready when someone offers you a drink. Try: \"Thanks, I've had enough.\" Or \"Thanks, but I'm cutting back.\" Or \"No, thanks. I feel better when I drink less.\"   Current as of: August 20, 2024  Content Version: 14.5    [id number] [employer].   Care instructions adapted under license by your healthcare professional. If you have questions about a medical condition or this instruction, always ask your healthcare professional. [employer] disclaims any warranty or liability for your use of this information.     Patient Education   Preventive Care Advice   This is general advice we often give to help people stay healthy. Your care team may have specific advice just for you. Please talk to your care team about your own preventive care needs.  Lifestyle  Exercise at least 150 minutes each week (30 minutes a day, 5 days a week).  Do muscle strengthening activities 2 days a week. These help control your weight and prevent disease.  No smoking.  Wear sunscreen to prevent skin cancer.  Take time with family and friends.  Have your home tested for radon every 2 to 5 years. Radon is a colorless, odorless gas that can harm your lungs. To learn more, go to www.health.[facility].[place].us and search for \"Radon in Homes.\"  Keep guns unloaded and locked up in a safe place like a safe or gun vault, or, use a gun lock and hide the keys. Always lock away bullets separately. To learn more, visit [employer].mn.gov and search for \"safe gun " "storage.\"  Nutrition  Eat 5 or more servings of fruits and vegetables each day.  Try wheat bread, brown rice and whole grain pasta (instead of white bread, rice, and pasta).  Get enough calcium and vitamin D. Check the label on foods and aim for 100% of the RDA (recommended daily allowance).  Regular exams  Have a dental exam and cleaning every 6 months.  Older adults: Ask your care team how often to have memory testing.  See your health care team every year to talk about:  Any changes in your health.  Any medicines your care team has prescribed.  Preventive care, family planning, and ways to prevent chronic diseases.  Shots (vaccines)   HPV shots (up to age 26), if you've never had them before.  Hepatitis B shots (up to age 59), if you've never had them before.  COVID-19 shot: Get this shot when it's due.  Flu shot: Get a flu shot every year.  Tetanus shot: Get a tetanus shot every 10 years.  Pneumococcal, hepatitis A, and RSV shots: Ask your care team if you need these based on your risk.  Shingles shot (for age 50 and up).  General health tests  Diabetes screening:  Starting at age 35, Get screened for diabetes at least every 3 years.  If you are younger than age 35, ask your care team if you should be screened for diabetes.  Cholesterol test: At age 39, start having a cholesterol test every 5 years, or more often if advised.  Bone density scan (DEXA): At age 50, ask your care team if you should have this scan for osteoporosis (brittle bones).  Hepatitis C: Get tested at least once in your life.  Abdominal aortic aneurysm screening: Talk to your doctor about having this screening if you:  Have ever smoked; and  Are biologically male; and  Are between the ages of 65 and 75.  STIs (sexually transmitted infections)  Before age 24: Ask your care team if you should be screened for STIs.  After age 24: Get screened for STIs if you're at risk. You are at risk for STIs (including HIV) if:  You are sexually active with " more than one person.  You don't use condoms every time.  You or a partner was diagnosed with a sexually transmitted infection.  If you are at risk for HIV, ask about PrEP medicine to prevent HIV.  Get tested for HIV at least once in your life, whether you are at risk for HIV or not.  Cancer screening tests  Cervical cancer screening: If you have a cervix, begin getting regular cervical cancer screening tests at age 21. Most people who have regular screenings with normal results can stop after age 65. Talk about this with your provider.  Breast cancer scan (mammogram): If you've ever had breasts, begin having regular mammograms starting at age 40. This is a scan to check for breast cancer.  Colon cancer screening: It is important to start screening for colon cancer at age 45.  Have a colonoscopy test every 10 years (or more often if you're at risk) Or, ask your provider about stool tests like a FIT test every year or Cologuard test every 3 years.  To learn more about your testing options, visit: www.[employer]/757986.pdf.  For help making a decision, visit: [email or web address].  Prostate cancer screening test: If you have a prostate and are age 55 to 69, ask your provider if you would benefit from a yearly prostate cancer screening test.  Lung cancer screening: If you are a current or former smoker age 50 to 80, ask your care team if ongoing lung cancer screenings are right for you.    For informational purposes only. Not to replace the advice of your health care provider. Copyright   2023 [facility]. All rights reserved. Clinically reviewed by the [facility] Transitions Program. [employer] [id number] - REV 6/25.  Hearing Loss: Care Instructions  Overview     Hearing loss is a sudden or slow decrease in how well you hear. It can range from slight to profound. Permanent hearing loss can occur with aging. It also can happen when you are exposed long-term to loud noise. Examples include listening to loud  music, riding motorcycles, or being around other loud machines.  Hearing loss can affect your work and home life. It can make you feel lonely or depressed. You may feel that you have lost your independence. But hearing aids and other devices can help you hear better and feel connected to others.  Follow-up care is a key part of your treatment and safety. Be sure to make and go to all appointments, and call your doctor if you are having problems. It's also a good idea to know your test results and keep a list of the medicines you take.  How can you care for yourself at home?  Avoid loud noises whenever possible. This helps keep your hearing from getting worse.  Always wear hearing protection around loud noises.  Wear a hearing aid as directed.  A professional can help you pick a hearing aid that will work best for you.  You can also get hearing aids over the counter for mild to moderate hearing loss.  Have hearing tests as your doctor suggests. They can show whether your hearing has changed. Your hearing aid may need to be adjusted.  Use other devices as needed. These may include:  Telephone amplifiers and hearing aids that can connect to a television, stereo, radio, or microphone.  Devices that use lights or vibrations. These alert you to the doorbell, a ringing telephone, or a baby monitor.  Television closed-captioning. This shows the words at the bottom of the screen. Most new TVs can do this.  TTY (text telephone). This lets you type messages back and forth on the telephone instead of talking or listening. These devices are also called TDD. When messages are typed on the keyboard, they are sent over the phone line to a receiving TTY. The message is shown on a monitor.  Use text messaging, social media, and email if it is hard for you to communicate by telephone.  Try to learn a listening technique called speechreading. It is not lipreading. You pay attention to people's gestures, expressions, posture, and tone  "of voice. These clues can help you understand what a person is saying. Face the person you are talking to, and have them face you. Make sure the lighting is good. You need to see the other person's face clearly.  Think about counseling if you need help to adjust to your hearing loss.  When should you call for help?  Watch closely for changes in your health, and be sure to contact your doctor if:    You think your hearing is getting worse.     You have new symptoms, such as dizziness or nausea.   Where can you learn more?  Go to https://www.[employer].net/patiented  Enter R798 in the search box to learn more about \"Hearing Loss: Care Instructions.\"  Current as of: October 27, 2024  Content Version: 14.5 2024-2025 [employer].   Care instructions adapted under license by your healthcare professional. If you have questions about a medical condition or this instruction, always ask your healthcare professional. [employer] disclaims any warranty or liability for your use of this information.    Learning About Stress  What is stress?     Stress is your body's response to a hard situation. Your body can have a physical, emotional, or mental response. Stress is a fact of life for most people, and it affects everyone differently. What causes stress for you may not be stressful for someone else.  A lot of things can cause stress. You may feel stress when you go on a job interview, take a test, or run a race. This kind of short-term stress is normal and even useful. It can help you if you need to work hard or react quickly. For example, stress can help you finish an important job on time.  Long-term stress is caused by ongoing stressful situations or events. Examples of long-term stress include long-term health problems, ongoing problems at work, or conflicts in your family. Long-term stress can harm your health.  How does stress affect your health?  When you are stressed, your body responds as though " you are in danger. It makes hormones that speed up your heart, make you breathe faster, and give you a burst of energy. This is called the fight-or-flight stress response. If the stress is over quickly, your body goes back to normal and no harm is done.  But if stress happens too often or lasts too long, it can have bad effects. Long-term stress can make you more likely to get sick, and it can make symptoms of some diseases worse. If you tense up when you are stressed, you may develop neck, shoulder, or low back pain. Stress is linked to high blood pressure and heart disease.  Stress also harms your emotional health. It can make you wright, tense, or depressed. Your relationships may suffer, and you may not do well at work or school.  What can you do to manage stress?  You can try these things to help manage stress:   Do something active. Exercise or activity can help reduce stress. Walking is a great way to get started. Even everyday activities such as housecleaning or yard work can help.  Try yoga or stepan chi. These techniques combine exercise and meditation. You may need some training at first to learn them.  Do something you enjoy. For example, listen to music or go to a movie. Practice your hobby or do volunteer work.  Meditate. This can help you relax, because you are not worrying about what happened before or what may happen in the future.  Do guided imagery. Imagine yourself in any setting that helps you feel calm. You can use online videos, books, or a teacher to guide you.  Do breathing exercises. For example:  From a standing position, bend forward from the waist with your knees slightly bent. Let your arms dangle close to the floor.  Breathe in slowly and deeply as you return to a standing position. Roll up slowly and lift your head last.  Hold your breath for just a few seconds in the standing position.  Breathe out slowly and bend forward from the waist.  Let your feelings out. Talk, laugh, cry, and  "express anger when you need to. Talking with supportive friends or family, a counselor, or a real leader about your feelings is a healthy way to relieve stress. Avoid discussing your feelings with people who make you feel worse.  Write. It may help to write about things that are bothering you. This helps you find out how much stress you feel and what is causing it. When you know this, you can find better ways to cope.  What can you do to prevent stress?  You might try some of these things to help prevent stress:  Manage your time. This helps you find time to do the things you want and need to do.  Get enough sleep. Your body recovers from the stresses of the day while you are sleeping.  Get support. Your family, friends, and community can make a difference in how you experience stress.  Limit your news feed. Avoid or limit time on social media or news that may make you feel stressed.  Do something active. Exercise or activity can help reduce stress. Walking is a great way to get started.  Where can you learn more?  Go to https://www.[employer].net/patiented  Enter N032 in the search box to learn more about \"Learning About Stress.\"  Current as of: October 24, 2024  Content Version: 14.5 2024-2025 [employer].   Care instructions adapted under license by your healthcare professional. If you have questions about a medical condition or this instruction, always ask your healthcare professional. [employer] disclaims any warranty or liability for your use of this information.    9 Ways to Cut Back on Drinking  Maybe you've found yourself drinking more alcohol than you'd prefer. If you want to cut back, here are some ideas to try.    Think before you drink.  Do you really want a drink, or is it just a habit? If you're used to having a drink at a certain time, try doing something else then.     Look for substitutes.  Find some no-alcohol drinks that you enjoy, like flavored seltzer water, tea with " "honey, or tonic with a slice of lime. Or try alcohol-free beer or \"virgin\" cocktails (without the alcohol).     Drink more water.  Use water to quench your thirst. Drink a glass of water before you have any alcohol. Have another glass along with every drink or between drinks.     Shrink your drink.  For example, have a bottle of beer instead of a pint. Use a smaller glass for wine. Choose drinks with lower alcohol content (ABV%). Or use less liquor and more mixer in cocktails.     Slow down.  It's easy to drink quickly and without thinking about it. Pay attention, and make each drink last longer.     Do the math.  Total up how much you spend on alcohol each month. How much is that a year? If you cut back, what could you do with the money you save?     Take a break.  Choose a day or two each week when you won't drink at all. Notice how you feel on those days, physically and emotionally. How did you sleep? Do you feel better? Over time, add more break days.     Count calories.  Would you like to lose some weight? For some people that's a good motivator for cutting back. Figure out how many calories are in each drink. How many does that add up to in a day? In a week? In a month?     Practice saying no.  Be ready when someone offers you a drink. Try: \"Thanks, I've had enough.\" Or \"Thanks, but I'm cutting back.\" Or \"No, thanks. I feel better when I drink less.\"   Current as of: August 20, 2024  Content Version: 14.5 2024-2025 [employer].   Care instructions adapted under license by your healthcare professional. If you have questions about a medical condition or this instruction, always ask your healthcare professional. [employer] disclaims any warranty or liability for your use of this information.     "

## 2025-07-29 NOTE — PROGRESS NOTES
Preventive Care Visit  [facility] LIZETH Gracia PA-C, Family Medicine  Jul 29, 2025      Assessment & Plan     Encounter for Medicare annual wellness exam  - Health maintenance and lifestyle reviewed. Updated medical and family history.  - Follow up in one year or sooner as needed.     Mild intermittent asthma without complication  Last ACT score was 23. Asthma is well controlled at this time. Patient to continue albuterol as needed, refills sent.   - albuterol (PROAIR HFA/PROVENTIL HFA/VENTOLIN HFA) 108 (90 Base) MCG/ACT inhaler  Dispense: 18 g; Refill: 3    Chronic insomnia  Managed by sleep medicine provider.     Mixed hyperlipidemia  Discussed recent elevated lipid panel. Patient to work on lifestyle and dietary changes. Patient to schedule lab visit to recheck cholesterol in 4-6 months.   - Lipid panel reflex to direct LDL Fasting  - PRIMARY CARE FOLLOW-UP SCHEDULING    Bilateral impacted cerumen  Ear lavaged completed by clinical assistant. Patient requests prescription for debrox to have at home.   - carbamide peroxide (DEBROX) 6.5 % otic solution  Dispense: 15 mL; Refill: 4  - REMOVE IMPACTED CERUMEN    Schizoaffective disorder, unspecified type (H)  Managed by psychiatrist.       Counseling  Appropriate preventive services were addressed with this patient via screening, questionnaire, or discussion as appropriate for fall prevention, nutrition, physical activity, Tobacco-use cessation, social engagement, weight loss and cognition.  Checklist reviewing preventive services available has been given to the patient.  Reviewed patient's diet, addressing concerns and/or questions.   He is at risk for lack of exercise and has been provided with information to increase physical activity for the benefit of his well-being.   The patient was instructed to see the dentist every 6 months.   He is at risk for psychosocial distress and has been provided with information to reduce risk.   The  patient reports drinking more than 3 alcoholic drinks per day and/or more than 7 drhnks per week. The patient was counseled and given information about possible harmful effects of excessive alcohol intake.Patient reported safety concerns were addressed today.The patient was provided with written information regarding signs of hearing loss.     Follow-up    Follow-up Visit   Expected date:  Oct 29, 2025 (Approximate)      Follow Up Appointment Details:     Follow-up with whom?: Other Primary Care Services    Follow-Up for what?: Lab Visit    How?: In Person             Follow-up Visit   Expected date:  Aug 05, 2026 (Approximate)      Follow Up Appointment Details:     Follow-up with whom?: PCP    Follow-Up for what?: Medicare Wellness    Welcome or Annual?: Annual Wellness    How?: In Person                 Hema Lau is a 40 year old, presenting for the following:  Physical (Would like [employer]bro[employer] ear gtts please)        7/29/2025     2:18 PM   Additional Questions   Roomed by Jackie LYONS    Presents for complete physical exam.    # history of chronic insomnia and KELLEY, not on CPAP  - follows regularly with sleep medicine  - currently on lunesta, doxepin, and magnesium    # history of schizoaffective disorder  - follows every 4-6 weeks virtually  - not established with a psychologist    # history of mild and intermittent asthma  - ACT score 25  - well controlled on albuterol as needed  - trigger for symptoms include worsening air quality, smoke, and allergies    # dysphagia  - gets episodes intermittently with solid foods. Has been improving with time.  - was told to try omeprazole, however patient denied any improvement so he stopped medication.     Advance Care Planning  Discussed advance care planning with patient; however, patient declined at this time.        7/28/2025   General Health   How would you rate your overall physical health? (!) FAIR   Feel stress (tense, anxious, or unable to sleep) Rather  much   (!) STRESS CONCERN      7/28/2025   Nutrition   Diet: Regular (no restrictions)         7/28/2025   Exercise   Days per week of moderate/strenous exercise 3 days   Average minutes spent exercising at this level 50 min         7/28/2025   Social Factors   Frequency of gathering with friends or relatives Patient declined   Worry food won't last until get money to buy more No   Food not last or not have enough money for food? Yes   Do you have housing? (Housing is defined as stable permanent housing and does not include staying outside in a car, in a tent, in an abandoned building, in an overnight shelter, or couch-surfing.) Yes   Are you worried about losing your housing? No   Lack of transportation? No   Unable to get utilities (heat,electricity)? No   (!) FOOD SECURITY CONCERN PRESENT      7/28/2025   Fall Risk   Fallen 2 or more times in the past year? No   Trouble with walking or balance? No          7/28/2025   Activities of Daily Living- Home Safety   Needs help with the following daily activites None of the above   Safety concerns in the home No grab bars in the bathroom         7/28/2025   Dental   Dentist two times every year? (!) NO         7/28/2025   Hearing Screening   Hearing concerns? (!) I FEEL THAT PEOPLE ARE MUMBLING OR NOT SPEAKING CLEARLY.    (!) I NEED TO ASK PEOPLE TO SPEAK UP OR REPEAT THEMSELVES.    (!) IT'S HARD TO FOLLOW A CONVERSATION IN A NOISY RESTAURANT OR CROWDED ROOM.    (!) TROUBLE FOLLOWING DIALOGUE IN THE THEATHER.    (!) TROUBLE UNDERSTANDING SOFT OR WHISPERED SPEECH.   Would you like a referral for hearing testing? No       Multiple values from one day are sorted in reverse-chronological order         7/28/2025   Driving Risk Screening   Patient/family members have concerns about driving No         7/28/2025   General Alertness/Fatigue Screening   Have you been more tired than usual lately? (!) DECLINE         7/28/2025   Urinary Incontinence Screening   Bothered by leaking  urine in past 6 months No     Today's PHQ-2 Score:       2025     3:41 PM   PHQ-2 (  [employer])   Q1: Little interest or pleasure in doing things 1   Q2: Feeling down, depressed or hopeless 1   PHQ-2 Score 2    Q1: Little interest or pleasure in doing things Several days   Q2: Feeling down, depressed or hopeless Several days   PHQ-2 Score 2       Patient-reported           2025   Substance Use   Alcohol more than 3/day or more than 7/wk Yes   How often do you have a drink containing alcohol Monthly or less   How many alcohol drinks on typical day 7 to 9   How often do you have 5+ drinks at one occasion Less than monthly   Audit 2/3 Score 4   How often not able to stop drinking once started Never   How often failed to do what normally expected Never   How often needed first drink in am after a heavy drinking session Never   How often feeling of guilt or remorse after drinking Less than monthly   How often unable to remember what happened the night before Never   Have you or someone else been injured because of your drinking No   Has anyone been concerned or suggested you cut down on drinking No   TOTAL SCORE - AUDIT 6   Do you have a current opioid prescription? No   How severe/bad is pain from 1 to 10? /10   Do you use any other substances recreationally? No     Social History     Tobacco Use    Smoking status: Former     Current packs/day: 0.00     Average packs/day: 1.5 packs/day for 6.2 years (9.3 ttl pk-yrs)     Types: Cigarettes     Start date: 10/11/1999     Quit date: 2006     Years since quittin.5     Passive exposure: Current    Smokeless tobacco: Never    Tobacco comments:     <1 ppd for most of the time (5-6 years)   Vaping Use    Vaping status: Never Used   Substance Use Topics    Alcohol use: Yes     Alcohol/week: 0.0 - 10.0 standard drinks of alcohol     Comment: A few times a month    Drug use: No     ASCVD Risk   The 10-year ASCVD risk score (Tamiko TRAMMELL, et al., 2019) is:  2.1%    Values used to calculate the score:      Age: 40 years      Sex: Male      Is Non- : No      Diabetic: No      Tobacco smoker: No      Systolic Blood Pressure: 126 mmHg      Is BP treated: No      HDL Cholesterol: 44 mg/dL      Total Cholesterol: 252 mg/dL        7/28/2025   Contraception/Family Planning   Questions about contraception or family planning No     Reviewed and updated as needed this visit by Provider   Tobacco  Allergies  Meds  Problems  Med Hx  Surg Hx  Fam Hx  Soc   Hx Sexual Activity        Current providers sharing in care for this patient include:  Patient Care Team:  Erik Newberry MD as PCP - General (Family Practice)  Jose Mcgovern MD as Assigned PCP  Carley Saul PA-C as Assigned Sleep Provider    The following health maintenance items are reviewed in Epic and correct as of today:  Health Maintenance   Topic Date Due    PNEUMOCOCCAL VACCINE: PEDIATRICS (0 to 5 YEARS) AND AT-RISK PATIENTS (6 to 49 YEARS) (1 of 2 - PCV) Never done    INFLUENZA VACCINE (1) 09/01/2025    ASTHMA CONTROL TEST  12/20/2025    BMP  07/25/2026    LIPID  07/25/2026    MEDICARE ANNUAL WELLNESS VISIT  07/29/2026    ANNUAL REVIEW OF HM ORDERS  07/29/2026    ASTHMA ACTION PLAN  07/29/2026    DIABETES SCREENING  07/25/2028    ADVANCE CARE PLANNING  03/05/2029    DTAP/TDAP/TD VACCINE (8 - Td or Tdap) 06/13/2032    ZOSTER VACCINE (1 of 2) 03/14/2035    HEPATITIS C SCREENING  Completed    HIV SCREENING  Completed    PHQ-2 (once per calendar year)  Completed    HPV VACCINE (No Doses Required) Completed    HEPATITIS B VACCINE  Completed    COVID-19 VACCINE  Completed    MENINGITIS VACCINE  Aged Out     Review of Systems  Constitutional, HEENT, cardiovascular, pulmonary, gi and gu systems are negative, except as otherwise noted.     Objective    Exam  /84 (BP Location: Right arm, Patient Position: Sitting, Cuff Size: Adult Large)   Pulse 87   Temp 98.7  F (37.1  C)  "(Temporal)   Resp 16   Ht 1.819 m (5' 11.61\")   Wt 105.9 kg (233 lb 6.4 oz)   SpO2 96%   BMI 32.00 kg/m     Estimated body mass index is 32 kg/m  as calculated from the following:    Height as of this encounter: 1.819 m (5' 11.61\").    Weight as of this encounter: 105.9 kg (233 lb 6.4 oz).    Physical Exam  GENERAL: alert and no distress  EYES: Eyes grossly normal to inspection, PERRL and conjunctivae and sclerae normal  HENT: ear canals and TM's normal, nose and mouth without ulcers or lesions  NECK: no adenopathy, no asymmetry, masses, or scars  RESP: lungs clear to auscultation - no rales, rhonchi or wheezes  CV: regular rate and rhythm, normal S1 S2,no murmur, click or rub, no peripheral edema  ABDOMEN: soft, nontender, no hepatosplenomegaly, no masses and bowel sounds normal  MS: no gross musculoskeletal defects noted, no edema  SKIN: no suspicious lesions or rashes  NEURO: Normal strength and tone, mentation intact and speech normal  PSYCH: mentation appears normal, affect normal/bright         7/29/2025   Mini Cog   Clock Draw Score 2 Normal   3 Item Recall 3 objects recalled   Mini Cog Total Score 5       Signed Electronically by: Myah Gracia PA-C    "

## 2025-07-30 PROBLEM — R03.0 ELEVATED BP WITHOUT DIAGNOSIS OF HYPERTENSION: Status: RESOLVED | Noted: 2024-07-16 | Resolved: 2025-07-30

## 2025-08-23 ENCOUNTER — NURSE TRIAGE (OUTPATIENT)
Dept: NURSING | Facility: CLINIC | Age: 40
End: 2025-08-23
Payer: MEDICARE